# Patient Record
Sex: MALE | Race: WHITE | NOT HISPANIC OR LATINO | Employment: OTHER | URBAN - METROPOLITAN AREA
[De-identification: names, ages, dates, MRNs, and addresses within clinical notes are randomized per-mention and may not be internally consistent; named-entity substitution may affect disease eponyms.]

---

## 2018-07-02 ENCOUNTER — ANESTHESIA EVENT (OUTPATIENT)
Dept: PERIOP | Facility: HOSPITAL | Age: 68
End: 2018-07-02
Payer: MEDICARE

## 2018-07-10 ENCOUNTER — TRANSCRIBE ORDERS (OUTPATIENT)
Dept: ADMINISTRATIVE | Facility: HOSPITAL | Age: 68
End: 2018-07-10

## 2018-07-10 ENCOUNTER — APPOINTMENT (OUTPATIENT)
Dept: PREADMISSION TESTING | Facility: HOSPITAL | Age: 68
End: 2018-07-10
Payer: MEDICARE

## 2018-07-10 ENCOUNTER — HOSPITAL ENCOUNTER (OUTPATIENT)
Dept: NON INVASIVE DIAGNOSTICS | Facility: HOSPITAL | Age: 68
Discharge: HOME | End: 2018-07-10
Attending: ORTHOPAEDIC SURGERY
Payer: MEDICARE

## 2018-07-10 ENCOUNTER — APPOINTMENT (OUTPATIENT)
Dept: LAB | Facility: HOSPITAL | Age: 68
End: 2018-07-10
Attending: ORTHOPAEDIC SURGERY
Payer: MEDICARE

## 2018-07-10 ENCOUNTER — HOSPITAL ENCOUNTER (OUTPATIENT)
Dept: RADIOLOGY | Facility: HOSPITAL | Age: 68
Discharge: HOME | End: 2018-07-10
Attending: ORTHOPAEDIC SURGERY
Payer: MEDICARE

## 2018-07-10 DIAGNOSIS — Z01.818 PREOP EXAMINATION: ICD-10-CM

## 2018-07-10 DIAGNOSIS — M17.12 OSTEOARTHRITIS OF LEFT KNEE, UNSPECIFIED OSTEOARTHRITIS TYPE: ICD-10-CM

## 2018-07-10 DIAGNOSIS — Z01.818 PREOP EXAMINATION: Primary | ICD-10-CM

## 2018-07-10 LAB
ABO GROUP BLD: NORMAL
ALBUMIN SERPL BCP-MCNC: 3.5 G/DL (ref 3.5–5)
ALP SERPL-CCNC: 108 U/L (ref 46–116)
ALT SERPL W P-5'-P-CCNC: 43 U/L (ref 12–78)
ANION GAP SERPL CALCULATED.3IONS-SCNC: 9 MMOL/L (ref 4–13)
AST SERPL W P-5'-P-CCNC: 31 U/L (ref 5–45)
ATRIAL RATE: 56 BPM
BASOPHILS # BLD AUTO: 0.07 THOUSANDS/ΜL (ref 0–0.1)
BASOPHILS NFR BLD AUTO: 1 % (ref 0–1)
BILIRUB SERPL-MCNC: 0.67 MG/DL (ref 0.2–1)
BILIRUB UR QL STRIP: ABNORMAL
BLD GP AB SCN SERPL QL: NEGATIVE
BUN SERPL-MCNC: 12 MG/DL (ref 5–25)
CALCIUM SERPL-MCNC: 9.5 MG/DL (ref 8.3–10.1)
CHLORIDE SERPL-SCNC: 105 MMOL/L (ref 100–108)
CLARITY UR: CLEAR
CO2 SERPL-SCNC: 27 MMOL/L (ref 21–32)
COLOR UR: YELLOW
CREAT SERPL-MCNC: 0.86 MG/DL (ref 0.6–1.3)
EOSINOPHIL # BLD AUTO: 0.34 THOUSAND/ΜL (ref 0–0.61)
EOSINOPHIL NFR BLD AUTO: 6 % (ref 0–6)
ERYTHROCYTE [DISTWIDTH] IN BLOOD BY AUTOMATED COUNT: 13.4 % (ref 11.6–15.1)
GFR SERPL CREATININE-BSD FRML MDRD: 91 ML/MIN/1.73SQ M
GLUCOSE P FAST SERPL-MCNC: 92 MG/DL (ref 65–99)
GLUCOSE UR STRIP-MCNC: NEGATIVE MG/DL
HCT VFR BLD AUTO: 45.5 % (ref 36.5–49.3)
HGB BLD-MCNC: 15.3 G/DL (ref 12–17)
HGB UR QL STRIP.AUTO: NEGATIVE
KETONES UR STRIP-MCNC: ABNORMAL MG/DL
LEUKOCYTE ESTERASE UR QL STRIP: NEGATIVE
LYMPHOCYTES # BLD AUTO: 1.03 THOUSANDS/ΜL (ref 0.6–4.47)
LYMPHOCYTES NFR BLD AUTO: 19 % (ref 14–44)
MCH RBC QN AUTO: 32.2 PG (ref 26.8–34.3)
MCHC RBC AUTO-ENTMCNC: 33.6 G/DL (ref 31.4–37.4)
MCV RBC AUTO: 96 FL (ref 82–98)
MONOCYTES # BLD AUTO: 0.7 THOUSAND/ΜL (ref 0.17–1.22)
MONOCYTES NFR BLD AUTO: 13 % (ref 4–12)
NEUTROPHILS # BLD AUTO: 3.23 THOUSANDS/ΜL (ref 1.85–7.62)
NEUTS SEG NFR BLD AUTO: 60 % (ref 43–75)
NITRITE UR QL STRIP: NEGATIVE
NRBC BLD AUTO-RTO: 0 /100 WBCS
P AXIS: 43 DEGREES
PH UR STRIP.AUTO: 5 [PH] (ref 4.5–8)
PLATELET # BLD AUTO: 149 THOUSANDS/UL (ref 149–390)
PMV BLD AUTO: 10.8 FL (ref 8.9–12.7)
POTASSIUM SERPL-SCNC: 3.7 MMOL/L (ref 3.5–5.3)
PR INTERVAL: 162 MS
PROT SERPL-MCNC: 7.5 G/DL (ref 6.4–8.2)
PROT UR STRIP-MCNC: NEGATIVE MG/DL
QRS AXIS: 49 DEGREES
QRSD INTERVAL: 92 MS
QT INTERVAL: 424 MS
QTC INTERVAL: 409 MS
RBC # BLD AUTO: 4.75 MILLION/UL (ref 3.88–5.62)
RH BLD: POSITIVE
SODIUM SERPL-SCNC: 141 MMOL/L (ref 136–145)
SP GR UR STRIP.AUTO: >=1.03 (ref 1–1.03)
SPECIMEN EXPIRATION DATE: NORMAL
T WAVE AXIS: 19 DEGREES
UROBILINOGEN UR QL STRIP.AUTO: 0.2 E.U./DL
VENTRICULAR RATE: 56 BPM
WBC # BLD AUTO: 5.37 THOUSAND/UL (ref 4.31–10.16)

## 2018-07-10 PROCEDURE — 86900 BLOOD TYPING SEROLOGIC ABO: CPT

## 2018-07-10 PROCEDURE — 80053 COMPREHEN METABOLIC PANEL: CPT

## 2018-07-10 PROCEDURE — 93005 ELECTROCARDIOGRAM TRACING: CPT

## 2018-07-10 PROCEDURE — 36415 COLL VENOUS BLD VENIPUNCTURE: CPT

## 2018-07-10 PROCEDURE — 81003 URINALYSIS AUTO W/O SCOPE: CPT | Performed by: ORTHOPAEDIC SURGERY

## 2018-07-10 PROCEDURE — 86850 RBC ANTIBODY SCREEN: CPT

## 2018-07-10 PROCEDURE — 85025 COMPLETE CBC W/AUTO DIFF WBC: CPT

## 2018-07-10 PROCEDURE — 71046 X-RAY EXAM CHEST 2 VIEWS: CPT

## 2018-07-10 PROCEDURE — 86901 BLOOD TYPING SEROLOGIC RH(D): CPT

## 2018-07-10 PROCEDURE — 93010 ELECTROCARDIOGRAM REPORT: CPT | Performed by: INTERNAL MEDICINE

## 2018-07-10 RX ORDER — CHLORHEXIDINE GLUCONATE 4 G/100ML
SOLUTION TOPICAL
COMMUNITY
End: 2018-07-19 | Stop reason: HOSPADM

## 2018-07-10 RX ORDER — PANTOPRAZOLE SODIUM 40 MG/1
40 TABLET, DELAYED RELEASE ORAL DAILY
COMMUNITY
Start: 2018-05-02

## 2018-07-10 RX ORDER — LACTULOSE 20 G/30ML
SOLUTION ORAL DAILY PRN
COMMUNITY

## 2018-07-10 RX ORDER — LORATADINE 10 MG/1
10 TABLET ORAL DAILY PRN
COMMUNITY

## 2018-07-10 RX ORDER — FLUTICASONE PROPIONATE 50 MCG
2 SPRAY, SUSPENSION (ML) NASAL DAILY PRN
COMMUNITY

## 2018-07-10 NOTE — ANESTHESIA PREPROCEDURE EVALUATION
Review of Systems/Medical History  Patient summary reviewed  Chart reviewed  No history of anesthetic complications     Cardiovascular  Negative cardio ROS    Pulmonary  Negative pulmonary ROS        GI/Hepatic    GERD , Liver disease (FATTY LIVER) ,        Negative  ROS        Endo/Other  Negative endo/other ROS      GYN       Hematology  Negative hematology ROS      Musculoskeletal    Arthritis     Neurology  Negative neurology ROS      Psychology   Negative psychology ROS              Physical Exam    Airway    Mallampati score: II  TM Distance: >3 FB  Neck ROM: full     Dental   No notable dental hx     Cardiovascular  Comment: Negative ROS, Rhythm: regular, Rate: normal, Cardiovascular exam normal    Pulmonary  Pulmonary exam normal Breath sounds clear to auscultation,     Other Findings        Anesthesia Plan  ASA Score- 2     Anesthesia Type- regional and spinal with ASA Monitors  Additional Monitors:   Airway Plan:     Comment: Adductor canal block preop  Plan Factors-Patient not instructed to abstain from smoking on day of procedure  Patient did not smoke on day of surgery  Induction- intravenous  Postoperative Plan-     Informed Consent- Anesthetic plan and risks discussed with patient

## 2018-07-10 NOTE — PRE-PROCEDURE INSTRUCTIONS
Pre-Surgery Instructions:   Medication Instructions    chlorhexidine (HIBICLENS) 4 % external liquid Patient was instructed by Physician and understands   Ergocalciferol (VITAMIN D2 PO) Instructed patient per Anesthesia Guidelines   fluticasone (FLONASE) 50 mcg/act nasal spray Instructed patient per Anesthesia Guidelines   lactulose 20 g/30 mL Instructed patient per Anesthesia Guidelines   loratadine (CLARITIN) 10 mg tablet Instructed patient per Anesthesia Guidelines   pantoprazole (PROTONIX) 40 mg tablet Instructed patient per Anesthesia Guidelines  Instructed to take pantoprazole am ofs urgery with sip ofw ater per anesthesia DR Kierra Rayo

## 2018-07-17 RX ORDER — CEFAZOLIN SODIUM 1 G/3ML
2000 INJECTION, POWDER, FOR SOLUTION INTRAMUSCULAR; INTRAVENOUS ONCE
Status: CANCELLED | OUTPATIENT
Start: 2018-07-18

## 2018-07-18 ENCOUNTER — HOSPITAL ENCOUNTER (OUTPATIENT)
Facility: HOSPITAL | Age: 68
Setting detail: OBSERVATION
Discharge: HOME | End: 2018-07-19
Attending: ORTHOPAEDIC SURGERY | Admitting: ORTHOPAEDIC SURGERY
Payer: MEDICARE

## 2018-07-18 ENCOUNTER — APPOINTMENT (OUTPATIENT)
Dept: RADIOLOGY | Facility: HOSPITAL | Age: 68
End: 2018-07-18
Payer: MEDICARE

## 2018-07-18 ENCOUNTER — ANESTHESIA (OUTPATIENT)
Dept: PERIOP | Facility: HOSPITAL | Age: 68
End: 2018-07-18
Payer: MEDICARE

## 2018-07-18 DIAGNOSIS — M17.12 PRIMARY OSTEOARTHRITIS OF LEFT KNEE: Primary | ICD-10-CM

## 2018-07-18 PROCEDURE — C1776 JOINT DEVICE (IMPLANTABLE): HCPCS | Performed by: ORTHOPAEDIC SURGERY

## 2018-07-18 PROCEDURE — G8982 BODY POS GOAL STATUS: HCPCS

## 2018-07-18 PROCEDURE — 97166 OT EVAL MOD COMPLEX 45 MIN: CPT

## 2018-07-18 PROCEDURE — C1713 ANCHOR/SCREW BN/BN,TIS/BN: HCPCS | Performed by: ORTHOPAEDIC SURGERY

## 2018-07-18 PROCEDURE — G8981 BODY POS CURRENT STATUS: HCPCS

## 2018-07-18 PROCEDURE — G8987 SELF CARE CURRENT STATUS: HCPCS

## 2018-07-18 PROCEDURE — 97163 PT EVAL HIGH COMPLEX 45 MIN: CPT

## 2018-07-18 PROCEDURE — 97110 THERAPEUTIC EXERCISES: CPT

## 2018-07-18 PROCEDURE — 73560 X-RAY EXAM OF KNEE 1 OR 2: CPT

## 2018-07-18 PROCEDURE — G8988 SELF CARE GOAL STATUS: HCPCS

## 2018-07-18 DEVICE — POSTERIOR STABILIZED FEMORAL
Type: IMPLANTABLE DEVICE | Site: KNEE | Status: FUNCTIONAL
Brand: TRIATHLON

## 2018-07-18 DEVICE — SYMMETRIC PATELLA
Type: IMPLANTABLE DEVICE | Site: KNEE | Status: FUNCTIONAL
Brand: TRIATHLON

## 2018-07-18 DEVICE — UNIVERSAL TIBIAL BASEPLATE
Type: IMPLANTABLE DEVICE | Site: KNEE | Status: FUNCTIONAL
Brand: TRIATHLON

## 2018-07-18 DEVICE — TIBIAL BEARING INSERT - PS
Type: IMPLANTABLE DEVICE | Site: KNEE | Status: FUNCTIONAL
Brand: TRIATHLON

## 2018-07-18 DEVICE — IMPLANTABLE DEVICE: Type: IMPLANTABLE DEVICE | Site: KNEE | Status: FUNCTIONAL

## 2018-07-18 RX ORDER — TETRACAINE HCL 10 MG/ML
INJECTION SUBARACHNOID AS NEEDED
Status: DISCONTINUED | OUTPATIENT
Start: 2018-07-18 | End: 2018-07-18 | Stop reason: SURG

## 2018-07-18 RX ORDER — PROPOFOL 10 MG/ML
INJECTION, EMULSION INTRAVENOUS AS NEEDED
Status: DISCONTINUED | OUTPATIENT
Start: 2018-07-18 | End: 2018-07-18 | Stop reason: SURG

## 2018-07-18 RX ORDER — ONDANSETRON 2 MG/ML
4 INJECTION INTRAMUSCULAR; INTRAVENOUS ONCE
Status: DISCONTINUED | OUTPATIENT
Start: 2018-07-18 | End: 2018-07-18 | Stop reason: HOSPADM

## 2018-07-18 RX ORDER — SENNOSIDES 8.6 MG
1 TABLET ORAL DAILY
Status: DISCONTINUED | OUTPATIENT
Start: 2018-07-19 | End: 2018-07-19 | Stop reason: HOSPADM

## 2018-07-18 RX ORDER — CALCIUM CARBONATE 200(500)MG
1000 TABLET,CHEWABLE ORAL DAILY PRN
Status: DISCONTINUED | OUTPATIENT
Start: 2018-07-18 | End: 2018-07-19 | Stop reason: HOSPADM

## 2018-07-18 RX ORDER — TRAMADOL HYDROCHLORIDE 50 MG/1
50 TABLET ORAL EVERY 6 HOURS PRN
Status: DISCONTINUED | OUTPATIENT
Start: 2018-07-18 | End: 2018-07-18

## 2018-07-18 RX ORDER — ONDANSETRON 2 MG/ML
INJECTION INTRAMUSCULAR; INTRAVENOUS AS NEEDED
Status: DISCONTINUED | OUTPATIENT
Start: 2018-07-18 | End: 2018-07-18 | Stop reason: SURG

## 2018-07-18 RX ORDER — OXYCODONE HYDROCHLORIDE 10 MG/1
10 TABLET ORAL EVERY 4 HOURS PRN
Status: DISCONTINUED | OUTPATIENT
Start: 2018-07-18 | End: 2018-07-19 | Stop reason: HOSPADM

## 2018-07-18 RX ORDER — SODIUM CHLORIDE, SODIUM LACTATE, POTASSIUM CHLORIDE, CALCIUM CHLORIDE 600; 310; 30; 20 MG/100ML; MG/100ML; MG/100ML; MG/100ML
125 INJECTION, SOLUTION INTRAVENOUS CONTINUOUS
Status: DISCONTINUED | OUTPATIENT
Start: 2018-07-18 | End: 2018-07-19 | Stop reason: HOSPADM

## 2018-07-18 RX ORDER — PANTOPRAZOLE SODIUM 40 MG/1
40 TABLET, DELAYED RELEASE ORAL
Status: DISCONTINUED | OUTPATIENT
Start: 2018-07-19 | End: 2018-07-19 | Stop reason: HOSPADM

## 2018-07-18 RX ORDER — ACETAMINOPHEN 325 MG/1
650 TABLET ORAL EVERY 6 HOURS PRN
Status: DISCONTINUED | OUTPATIENT
Start: 2018-07-18 | End: 2018-07-18

## 2018-07-18 RX ORDER — VANCOMYCIN HYDROCHLORIDE 1 G/200ML
1000 INJECTION, SOLUTION INTRAVENOUS ONCE
Status: COMPLETED | OUTPATIENT
Start: 2018-07-18 | End: 2018-07-18

## 2018-07-18 RX ORDER — ROPIVACAINE HYDROCHLORIDE 5 MG/ML
INJECTION, SOLUTION EPIDURAL; INFILTRATION; PERINEURAL AS NEEDED
Status: DISCONTINUED | OUTPATIENT
Start: 2018-07-18 | End: 2018-07-18 | Stop reason: SURG

## 2018-07-18 RX ORDER — OXYCODONE HYDROCHLORIDE 5 MG/1
5 TABLET ORAL EVERY 4 HOURS PRN
Status: DISCONTINUED | OUTPATIENT
Start: 2018-07-18 | End: 2018-07-19 | Stop reason: HOSPADM

## 2018-07-18 RX ORDER — FENTANYL CITRATE 50 UG/ML
INJECTION, SOLUTION INTRAMUSCULAR; INTRAVENOUS AS NEEDED
Status: DISCONTINUED | OUTPATIENT
Start: 2018-07-18 | End: 2018-07-18 | Stop reason: SURG

## 2018-07-18 RX ORDER — PROPOFOL 10 MG/ML
INJECTION, EMULSION INTRAVENOUS CONTINUOUS PRN
Status: DISCONTINUED | OUTPATIENT
Start: 2018-07-18 | End: 2018-07-18 | Stop reason: SURG

## 2018-07-18 RX ORDER — PANTOPRAZOLE SODIUM 40 MG/1
40 TABLET, DELAYED RELEASE ORAL DAILY
Status: DISCONTINUED | OUTPATIENT
Start: 2018-07-19 | End: 2018-07-18 | Stop reason: SDUPTHER

## 2018-07-18 RX ORDER — KETOROLAC TROMETHAMINE 30 MG/ML
15 INJECTION, SOLUTION INTRAMUSCULAR; INTRAVENOUS EVERY 6 HOURS PRN
Status: DISCONTINUED | OUTPATIENT
Start: 2018-07-18 | End: 2018-07-19 | Stop reason: HOSPADM

## 2018-07-18 RX ORDER — TRANEXAMIC ACID 100 MG/ML
INJECTION, SOLUTION INTRAVENOUS AS NEEDED
Status: DISCONTINUED | OUTPATIENT
Start: 2018-07-18 | End: 2018-07-18 | Stop reason: HOSPADM

## 2018-07-18 RX ORDER — ASPIRIN 81 MG/1
81 TABLET ORAL 2 TIMES DAILY
Status: DISCONTINUED | OUTPATIENT
Start: 2018-07-18 | End: 2018-07-19 | Stop reason: HOSPADM

## 2018-07-18 RX ORDER — TRANEXAMIC ACID 100 MG/ML
INJECTION, SOLUTION INTRAVENOUS AS NEEDED
Status: DISCONTINUED | OUTPATIENT
Start: 2018-07-18 | End: 2018-07-18 | Stop reason: SURG

## 2018-07-18 RX ORDER — MAGNESIUM HYDROXIDE/ALUMINUM HYDROXICE/SIMETHICONE 120; 1200; 1200 MG/30ML; MG/30ML; MG/30ML
20 SUSPENSION ORAL EVERY 6 HOURS PRN
Status: DISCONTINUED | OUTPATIENT
Start: 2018-07-18 | End: 2018-07-19 | Stop reason: HOSPADM

## 2018-07-18 RX ORDER — LORATADINE 10 MG/1
10 TABLET ORAL DAILY PRN
Status: DISCONTINUED | OUTPATIENT
Start: 2018-07-18 | End: 2018-07-19 | Stop reason: HOSPADM

## 2018-07-18 RX ORDER — PANTOPRAZOLE SODIUM 40 MG/1
40 TABLET, DELAYED RELEASE ORAL DAILY
Status: DISCONTINUED | OUTPATIENT
Start: 2018-07-19 | End: 2018-07-18

## 2018-07-18 RX ORDER — MAGNESIUM HYDROXIDE 1200 MG/15ML
LIQUID ORAL AS NEEDED
Status: DISCONTINUED | OUTPATIENT
Start: 2018-07-18 | End: 2018-07-18 | Stop reason: HOSPADM

## 2018-07-18 RX ORDER — MIDAZOLAM HYDROCHLORIDE 1 MG/ML
INJECTION INTRAMUSCULAR; INTRAVENOUS AS NEEDED
Status: DISCONTINUED | OUTPATIENT
Start: 2018-07-18 | End: 2018-07-18 | Stop reason: SURG

## 2018-07-18 RX ORDER — METHOCARBAMOL 500 MG/1
500 TABLET, FILM COATED ORAL EVERY 6 HOURS PRN
Status: DISCONTINUED | OUTPATIENT
Start: 2018-07-18 | End: 2018-07-19 | Stop reason: HOSPADM

## 2018-07-18 RX ORDER — FLUTICASONE PROPIONATE 50 MCG
2 SPRAY, SUSPENSION (ML) NASAL DAILY PRN
Status: DISCONTINUED | OUTPATIENT
Start: 2018-07-18 | End: 2018-07-19 | Stop reason: HOSPADM

## 2018-07-18 RX ORDER — CELECOXIB 200 MG/1
200 CAPSULE ORAL DAILY
Status: DISCONTINUED | OUTPATIENT
Start: 2018-07-19 | End: 2018-07-19 | Stop reason: HOSPADM

## 2018-07-18 RX ORDER — SODIUM CHLORIDE 9 MG/ML
125 INJECTION, SOLUTION INTRAVENOUS CONTINUOUS
Status: DISCONTINUED | OUTPATIENT
Start: 2018-07-18 | End: 2018-07-19 | Stop reason: HOSPADM

## 2018-07-18 RX ORDER — FENTANYL CITRATE/PF 50 MCG/ML
25 SYRINGE (ML) INJECTION
Status: DISCONTINUED | OUTPATIENT
Start: 2018-07-18 | End: 2018-07-18 | Stop reason: HOSPADM

## 2018-07-18 RX ADMIN — SODIUM CHLORIDE: 0.9 INJECTION, SOLUTION INTRAVENOUS at 13:00

## 2018-07-18 RX ADMIN — TETRACAINE HCL 1.2 ML: 10 INJECTION SUBARACHNOID at 12:45

## 2018-07-18 RX ADMIN — METHOCARBAMOL 500 MG: 500 TABLET ORAL at 20:05

## 2018-07-18 RX ADMIN — SODIUM CHLORIDE 125 ML/HR: 0.9 INJECTION, SOLUTION INTRAVENOUS at 08:40

## 2018-07-18 RX ADMIN — TRANEXAMIC ACID 1000 MG: 100 INJECTION, SOLUTION INTRAVENOUS at 12:49

## 2018-07-18 RX ADMIN — FENTANYL CITRATE 100 MCG: 50 INJECTION, SOLUTION INTRAMUSCULAR; INTRAVENOUS at 12:40

## 2018-07-18 RX ADMIN — PROPOFOL 40 MG: 10 INJECTION, EMULSION INTRAVENOUS at 12:58

## 2018-07-18 RX ADMIN — SODIUM CHLORIDE 125 ML/HR: 0.9 INJECTION, SOLUTION INTRAVENOUS at 12:01

## 2018-07-18 RX ADMIN — SODIUM CHLORIDE: 0.9 INJECTION, SOLUTION INTRAVENOUS at 14:02

## 2018-07-18 RX ADMIN — DEXAMETHASONE SODIUM PHOSPHATE 4 MG: 10 INJECTION INTRAMUSCULAR; INTRAVENOUS at 12:56

## 2018-07-18 RX ADMIN — ROPIVACAINE HYDROCHLORIDE 30 ML: 5 INJECTION, SOLUTION EPIDURAL; INFILTRATION; PERINEURAL at 12:21

## 2018-07-18 RX ADMIN — ACETAMINOPHEN 650 MG: 325 TABLET, FILM COATED ORAL at 16:40

## 2018-07-18 RX ADMIN — MIDAZOLAM 4 MG: 1 INJECTION INTRAMUSCULAR; INTRAVENOUS at 12:16

## 2018-07-18 RX ADMIN — CEFAZOLIN SODIUM 2000 MG: 2 SOLUTION INTRAVENOUS at 12:35

## 2018-07-18 RX ADMIN — FENTANYL CITRATE 100 MCG: 50 INJECTION, SOLUTION INTRAMUSCULAR; INTRAVENOUS at 12:16

## 2018-07-18 RX ADMIN — CEFAZOLIN SODIUM 1000 MG: 1 SOLUTION INTRAVENOUS at 20:05

## 2018-07-18 RX ADMIN — OXYCODONE HYDROCHLORIDE 5 MG: 5 TABLET ORAL at 18:33

## 2018-07-18 RX ADMIN — PROPOFOL 75 MCG/KG/MIN: 10 INJECTION, EMULSION INTRAVENOUS at 13:00

## 2018-07-18 RX ADMIN — OXYCODONE HYDROCHLORIDE 10 MG: 10 TABLET ORAL at 22:51

## 2018-07-18 RX ADMIN — ONDANSETRON HYDROCHLORIDE 4 MG: 2 INJECTION, SOLUTION INTRAVENOUS at 14:05

## 2018-07-18 RX ADMIN — ASPIRIN 81 MG: 81 TABLET, COATED ORAL at 18:33

## 2018-07-18 RX ADMIN — MIDAZOLAM 2 MG: 1 INJECTION INTRAMUSCULAR; INTRAVENOUS at 12:35

## 2018-07-18 RX ADMIN — SODIUM CHLORIDE, SODIUM LACTATE, POTASSIUM CHLORIDE, AND CALCIUM CHLORIDE 125 ML/HR: .6; .31; .03; .02 INJECTION, SOLUTION INTRAVENOUS at 18:34

## 2018-07-18 RX ADMIN — VANCOMYCIN HYDROCHLORIDE 1000 MG: 1 INJECTION, SOLUTION INTRAVENOUS at 12:51

## 2018-07-18 NOTE — PROGRESS NOTES
Doing well  CDI  No dorsi/plantar bilaterally, c/w spinal effect  A/P: NV checks by nursing; PT; Med input; pain control

## 2018-07-18 NOTE — OCCUPATIONAL THERAPY NOTE
OccupationalTherapy Evaluation(time=1650-1720)     Patient Name: Pam LION Date: 7/18/2018  Problem List  There is no problem list on file for this patient  Past Medical History  Past Medical History:   Diagnosis Date    Fatty liver     Food allergy     strawberries- tickling in throat    GERD (gastroesophageal reflux disease)     OA (osteoarthritis)     left knee    Seasonal allergies     Wears glasses      Past Surgical History  Past Surgical History:   Procedure Laterality Date    CATARACT EXTRACTION Bilateral     ESOPHAGOGASTRODUODENOSCOPY      KNEE ARTHROSCOPY Left     LIVER BIOPSY           07/18/18 1721   Note Type   Note type Eval only   Restrictions/Precautions   Weight Bearing Precautions Per Order Yes   LLE Weight Bearing Per Order WBAT   Other Precautions Multiple lines; Fall Risk;Pain   Pain Assessment   Pain Assessment 0-10   Pain Score 2   Pain Type Surgical pain;Acute pain   Pain Location Ankle; Foot   Pain Orientation Left   Home Living   Type of [address] [place] [address] Two level;Bed/bath upstairs  (3 stevie, 13steps to 2nd)   Prior Function   Lives With Spouse   Lifestyle   Autonomy PTA pt states independence with all aspects of his ADLs, transfers, ambulation; neg falls, neg home alone   Reciprocal Relationships supportive TAMMY, wife   Service to Others retired   Intrinsic Gratification watching TV   Psychosocial   Psychosocial (WDL) WDL   Subjective   Subjective "I don't have too much feeling in my legs "   ADL   Where Assessed Edge of bed   Eating Assistance 6  Modified independent   Grooming Assistance 6  Modified Independent   UB Bathing Assistance 5  Supervision/Setup   LB Bathing Assistance 4  [address] 5  Supervision/Setup   LB Dressing Assistance 3  Moderate Assistance   Bed Mobility   Rolling R 4  Minimal assistance   Additional items Assist x 1; Increased time required;Verbal cues;LE management   Rolling L 4  Minimal assistance Additional items Assist x 1; Increased time required;LE management;Verbal cues   Supine to Sit 3  Moderate assistance   Additional items Assist x 1; Increased time required;Verbal cues;LE management   Sit to Supine 3  Moderate assistance   Additional items Assist x 1; Increased time required;Verbal cues   Transfers   Sit to Stand Unable to assess  (2* decreased motor movement b/l LE's)   Functional Mobility   Functional Mobility (TBA)   Balance   Static Sitting Poor +   Dynamic Sitting Poor   Activity Tolerance   Activity Tolerance Patient limited by fatigue;Patient limited by pain;Treatment limited secondary to medical complications (Comment)   Medical Staff Made Aware nsg, P T     RUE Assessment   RUE Assessment WFL   RUE Strength   RUE Overall Strength Within Functional Limits - strength 5/5   LUE Assessment   LUE Assessment WFL   LUE Strength   LUE Overall Strength Within Functional Limits - strength 5/5   Hand Function   Gross Motor Coordination Functional   Fine Motor Coordination Functional   Sensation   Light Touch No apparent deficits   Proprioception   Proprioception No apparent deficits   Vision-Basic Assessment   Current Vision Wears glasses all the time   Vision - Complex Assessment   Acuity Able to read clock/calendar on wall without difficulty   Perception   Inattention/Neglect Appears intact   Cognition   Overall Cognitive Status WFL   Arousal/Participation Alert   Attention Within functional limits   Orientation Level Oriented X4   Memory Within functional limits   Following Commands Follows all commands and directions without difficulty   Assessment   Limitation Decreased ADL status; Decreased Safe judgement during ADL;Decreased endurance   Prognosis Good   Assessment Pt is a 66y/o male admitted to the hospital for an elected L TKR(7/18) 2* hx OA and knee instability(i e  states hx dislocations/need for hinged brace)   PTA pt states independence with all aspects of his ADLs, transfers, ambulation; neg falls, neg home alone  During initial eval, pt demonstrated deficits with his functional balance, functional mobility, and ADL status  Functional mobility assessment limited 2* poor motor movement from his spinal  Pt would benefit from continued OT assessement and tx for the above deficits  1-4 OT tx sessions  Goals   Patient Goals "to go home "   STG Time Frame 3-5   Short Term Goal #1 Pt will tolerate continued functional mobility assessment and appropriate goals will be established  Short Term Goal #2 Pt will demonstrate mod I with their bed mobility to facilitate EOB ADLs  Short Term Goal  Pt will demonstrate mod I with their sit-stand transfers to assist with completion of their LE dressing  LTG Time Frame (5-7)   Long Term Goal #1 Pt will demonstrate g/g- balance with all functional activities  Long Term Goal #2 Pt will demonstrate mod I with their UE and LE bathing/dresssing  Long Term Goal Pt will demonstrate proper walker/transfer safety 100% of the time  Plan   Treatment Interventions ADL retraining;Functional transfer training; Endurance training;Patient/family training;Equipment evaluation/education; Compensatory technique education;Continued evaluation   Goal Expiration Date 07/25/18   Treatment Day 0   OT Frequency 3-5x/wk   Recommendation   OT Discharge Recommendation (continue P T  )   Equipment Recommended Bedside commode  (RW)   Barthel Index   Feeding 10   Bathing 0   Grooming Score 5   Dressing Score 5   Bladder Score 0   Bowels Score 10   Toilet Use Score 5   Transfers (Bed/Chair) Score 5   Mobility (Level Surface) Score 0   Stairs Score 0   Barthel Index Score 40   Demetra Rosa, OT

## 2018-07-18 NOTE — ANESTHESIA PROCEDURE NOTES
Spinal Block    Patient location during procedure: OR  Start time: 7/18/2018 12:45 PM  Staffing  Anesthesiologist: Alison Paz  Performed: anesthesiologist   Preanesthetic Checklist  Completed: patient identified, surgical consent, pre-op evaluation, timeout performed, IV checked, risks and benefits discussed and monitors and equipment checked  Spinal Block  Patient position: sitting  Prep: Betadine  Patient monitoring: continuous pulse ox and frequent blood pressure checks  Approach: midline  Location: L3-4  Injection technique: single-shot  Needle  Needle type: pencil-tip   Needle gauge: 24 G  Needle length: 5 cm  Assessment  Injection Assessment:  negative aspiration for heme, no paresthesia on injection and positive aspiration for clear CSF    Post-procedure:  site cleaned

## 2018-07-18 NOTE — PHYSICAL THERAPY NOTE
PT EVALUATION (16:48-17:10= 22mins)    Pt  Name: Rosette Hagen  Pt  Age: 74 y o  MRN: [id number]    There is no problem list on file for this patient  Admitting Diagnoses:   Primary osteoarthritis of left knee [M17 12]    Past Medical History:   Diagnosis Date    Fatty liver     Food allergy     strawberries- tickling in throat    GERD (gastroesophageal reflux disease)     OA (osteoarthritis)     left knee    Seasonal allergies     Wears glasses        Past Surgical History:   Procedure Laterality Date    CATARACT EXTRACTION Bilateral     ESOPHAGOGASTRODUODENOSCOPY      KNEE ARTHROSCOPY Left     LIVER BIOPSY        07/18/18 1720   Note Type   Note type Eval/Treat   Pain Assessment   Pain Assessment 0-10   Pain Score 2   Pain Location Ankle   Hospital Pain Intervention(s) Repositioned   Home Living   Type of [address] Two level;Stairs to enter with rails  (2+1STE; 13steps to 2nd level)   Home Equipment Other (Comment)  (no DME)   Prior Function   Level of [place] Independent with ADLs and functional mobility   Lives With Spouse   Receives Help From Family   ADL Assistance Independent   Falls in the last 6 months 0   Comments prior to surgery, pt was wearing L hinged-knee brace 2* to L knee instability   Restrictions/Precautions   Weight Bearing Precautions Per Order Yes   LLE Weight Bearing Per Order WBAT   Other Precautions Multiple lines; Fall Risk;Pain  (impaired BLE motor & sensation)   General   Family/Caregiver Present Yes  (wife)   Cognition   Overall Cognitive Status WFL   Arousal/Participation Alert   Orientation Level Oriented X4   Following Commands Follows all commands and directions without difficulty   RUE Assessment   RUE Assessment (refer to OT)   LUE Assessment   LUE Assessment (refer to OT)   RLE Assessment   RLE Assessment X   Strength RLE   RLE Overall Strength 2/5   Tone RLE   RLE Tone Hypotonic   LLE Assessment   LLE Assessment X   Strength LLE   LLE Overall Strength 1/5   Tone LLE   LLE Tone Hypotonic   Coordination   Sensation X  (BLE numbness, proximal>distal & LLE>RLE)   Bed Mobility   Supine to Sit 3  Moderate assistance   Additional items Assist x 1;HOB elevated; Bedrails; Increased time required;LE management;Verbal cues   Sit to Supine 3  Moderate assistance   Additional items Assist x 1;Bedrails; Increased time required;Verbal cues;LE management   Additional Comments cues for techniques   Transfers   Sit to Stand Unable to assess   Stand to Sit Unable to assess   Additional Comments not apppropriate at this time 2* to poor motor & sensation BLE   Ambulation/Elevation   Gait pattern Not appropriate   Balance   Static Sitting Poor +   Dynamic Sitting Poor   Static Standing Zero   Activity Tolerance   Activity Tolerance Patient tolerated treatment well   Nurse Made Aware Nicolasa   Assessment   Prognosis Good   Problem List Decreased strength;Decreased range of motion;Decreased endurance; Impaired balance;Decreased mobility; Impaired tone; Impaired sensation;Pain   Assessment Pt  74 y  o male admitted for elective L TKR 2* to severe DJD, POD#0  Pt referred to PT for mobility assessment & D/C planning  Activity order: activity as tolerated  PTA, pt reports being I w/o AD but wears L hinged-knee brace 2* to L knee instability  During visit, PT eval limited to bedlevel assessment only 2* to post-op poor BLE motor & sensation  (+) BLE numbness, proximal>distal & LLE>RLE  Pt require modAx1 for bed mobility + cues for techniques  (+) poor sitting balance  Transfers & amb not appropriate at this time 2* to poor BLE motor & sensation  No dizziness reported t/o session  Most recent vital signs as follows: /88 (BP Location: Right arm)   Pulse 82   Temp (!) 97 °F (36 1 °C) (Temporal)   Resp 20   Ht 5' 5" (1 651 m)   Wt 63 7 kg (140 lb 6 4 oz)   SpO2 97%   BMI 23 36 kg/m²   Pt performed BLE thera  ex while sitting at EOB, please see PT tx note below for details  At end of session, pt back in bed w/o issues, call bell & phone in reach  Will continue skilled PT to improve function & safety  PT to see next tx session to complete OOB mobility assessment & determine D/C rec  Of note, pt prefers to return home at D/C  CM to follow  Nsg staff to continue to mobilized pt as tolerated to prevent further decline in function  Nsg notified  Barriers to Discharge Inaccessible home environment   Goals   Patient Goals go home   STG Expiration Date 07/25/18   Short Term Goal #1 Goals to be met in 7 days; pt will be able to: 1) inc strength & balance by 1/2 grade to improve overall functional mobility & dec fall risk; 2) inc bed mobility to S for pt to be able to get in/OOB safely w/o % of the time & to dec caregiver assistance; 3) PT to complete OOB mobility assessment & set appropriate goals; 4) inc barthel score to 55 to decrease overall risk for falls; 5) pt/caregiver ed   Treatment Day 0   Plan   Treatment/Interventions LE strengthening/ROM; Therapeutic exercise; Endurance training;Patient/family training;Bed mobility;Continued evaluation;Spoke to nursing;OT;Family   PT Frequency Twice a day;7x/wk; Weekend   Recommendation   Recommendation Home PT;Outpatient PT; Home with family support  (depending on progress)   Equipment Recommended Walker; Other (Comment)  (RW & BSC)   PT - OK to Discharge No  (pt to achieve PT goals prior to D/C home)   Barthel Index   Feeding 10   Bathing 0   Grooming Score 5   Dressing Score 5   Bladder Score 0   Bowels Score 10   Toilet Use Score 5   Transfers (Bed/Chair) Score 0   Mobility (Level Surface) Score 0   Stairs Score 0   Barthel Index Score 35   Hx/personal factors: comorbidities, inaccessible home, pain, use of AD, fall risk  Examination: dec mobility, dec balance, dec endurance, dec amb, high fall risk, pain, impaired BLE motor & sensation  Clinical: unpredictable (ongoing medical status, high fall risk, POD #0 and pain mgt)  Complexity: high PT TREATMENT NOTE:    TIME IN: 16:10  TIME OUT: 16:20  TOTAL TIME: 10mins      S: Pt agreeable to thera ex   O: Pt performed seated TKR ex, 10reps, BLE PROM  A: Pt tolerated above mentioned thera  ex well  After exercises noted inc AROM to B/L ankle  Pt instructed on TKR protocol, PT POC & intervention as well as D/C rec w/ good understanding  Pt assisted back in bed at end of session  Call bell & phone in reach  Nsg notified  P: Continue PT per POC  PT to see next tx session         Errol Correia, PT

## 2018-07-18 NOTE — PLAN OF CARE
Problem: PHYSICAL THERAPY ADULT  Goal: Performs mobility at highest level of function for planned discharge setting  See evaluation for individualized goals  Treatment/Interventions: LE strengthening/ROM, Therapeutic exercise, Endurance training, Patient/family training, Bed mobility, Continued evaluation, Spoke to nursing, OT, Family  Equipment Recommended: Claudia Valdez, Other (Comment) ( & [facility])       See flowsheet documentation for full assessment, interventions and recommendations  Outcome: Progressing  Prognosis: Good  Problem List: Decreased strength, Decreased range of motion, Decreased endurance, Impaired balance, Decreased mobility, Impaired tone, Impaired sensation, Pain  Assessment: Pt  74 y  o male admitted for elective L TKR 2* to severe DJD, POD#0  Pt referred to PT for mobility assessment & D/C planning  Activity order: activity as tolerated  PTA, pt reports being I w/o AD but wears L hinged-knee brace 2* to L knee instability  During visit, PT eval limited to bedlevel assessment only 2* to post-op poor BLE motor & sensation  (+) BLE numbness, proximal>distal & LLE>RLE  Pt require modAx1 for bed mobility + cues for techniques  (+) poor sitting balance  Transfers & amb not appropriate at this time 2* to poor BLE motor & sensation  No dizziness reported t/o session  Most recent vital signs as follows: /88 (BP Location: Right arm)   Pulse 82   Temp (!) 97 °F (36 1 °C) (Temporal)   Resp 20   Ht 5' 5" (1 651 m)   Wt 63 7 kg (140 lb 6 4 oz)   SpO2 97%   BMI 23 36 kg/m²   Pt performed BLE thera  ex while sitting at EOB, please see PT tx note below for details  At end of session, pt back in bed w/o issues, call bell & phone in reach  Will continue skilled PT to improve function & safety  PT to see next tx session to complete OOB mobility assessment & determine D/C rec  Of note, pt prefers to return home at D/C  CM to follow   Nsg staff to continue to mobilized pt as tolerated to prevent further decline in function  Nsg notified  Barriers to Discharge: Inaccessible home environment     Recommendation: Home PT, Outpatient PT, Home with family support (depending on progress)     PT - OK to Discharge: No (pt to achieve PT goals prior to D/C home)    See flowsheet documentation for full assessment

## 2018-07-18 NOTE — OP NOTE
OPERATIVE REPORT  PATIENT NAME: Charmayne Neighbors    :  1950  MRN: [id number]  Pt Location: [facility] OR ROOM 01    Operative Note    Charmayne Neighbors  2018    Pre-op Diagnosis:   Left Knee Osteoarthritis    Post-op Diagnosis:   Left Knee Osteoarthritis    Procedure:  Left Knee Replacement    Surgeon:  Chata Anderson MD    Assistants:  MICHAEL Thornton CFA       Anesthesia:  Spinal with peripheral nerve block  Components:     Implant Name Type Inv  Item Serial No   Lot No  LRB No  Used   CEMENT BN 40 GM PALACOS HVG RADIOPAQUE - [id number]  CEMENT BN 40 GM PALACOS HVG RADIOPAQUE  [employer] [id number] Left 2   COMPONENT FEM CMNT SZ 4 LT PS TRIATHLON - [id number]  COMPONENT FEM CMNT SZ 4 LT PS TRIATHLON  [employer] [id number] Left 1   BASEPLATE TIBIAL CMNT SZ 3 TS UNV TRIATHLON - [id number]  BASEPLATE TIBIAL CMNT SZ 3 TS UNV TRIATHLON  [employer] [id number] Left 1   COMPONENT PATELLAR SMTR 29 X 8MM  TRIATHLON X3 - [id number]  COMPONENT PATELLAR SMTR 29 X 8MM  TRIATHLON X3  [employer] [id number] Left 1   INSERT TIBIAL 11MM SZ 3 PS TRIATHLON X3 - [id number]   INSERT TIBIAL 11MM SZ 3 PS TRIATHLON X3   [employer] [id number] Left 1          INDICATION: Charmayne Neighbors is a 74 y o  male with advanced osteoarthritis of the Left knee, which was refractory to nonoperative therapy  He understood the risks and benefits of knee replacement and wished to proceed  DESCRIPTION OF PROCEDURE: On 2018 the patient was brought to the holding area  The identity and surgical site were confirmed by him and the surgeon  Perioperative intravenous antibiotics were given  Anesthesia was administered by the anesthesia team  His Left lower extremity was prepped and draped in the usual sterile manner  An anterior midline incision incorporating the prior anterior distal ACL incision was followed by a medial parapatellar arthrotomy  Osteophytes were removed  The ACL and PCL were both resected  The extramedullary guide was used to make the proximal tibial cut  We checked the alignment and found it to be satisfactory  Then the intramedullary guide was placed in the femoral canal allowing us to make the distal femoral cut in 5 degrees of valgus  With the knee in extension, we were able to place an extension block with full extension and good soft tissue tension and no further releases were needed  Then the femur was appropriately sized based on the femoral dimensions the flexion/extension gap matching  The rotation was cross-referenced against the transepicondylar axis and found to be satisfactory  With the cutting block in place, we were able to place a similarly sized flexion block with good soft tissue tension  Therefore, femoral cuts were made  Posterior osteophytes were removed  The medial and lateral menisci were removed  A posterior capsular release was made  The patella was resurfaced and the size was chosen based on optimal coverage of the cut surface, and a caliper was used before and after the cut to ensure optimal thickness  At this point, the implants were trialed and the knee was able to come to full extension with good flexion and appropriate soft tissue tension throughout the range of motion and good patellar stability  This would be rechecked after the components are cemented  This was checked with the tourniquet down and all bleeders were carefully coagulated  The tibia was prepared in a proper amount of external rotation and the tibial size was chosen to achieve optimal bony coverage without overhang  The tourniquet was inflated after coagulating all bleeders and the knee was irrigated copiously and the bony surfaces were dried thoroughly  Cement was mixed and applied to the backside of the components  The tibia was cemented first, followed by the femur  Excess cement was removed  Then a trial spacer was placed  The knee was reduced and pressurized in extension   The patella was then cemented  We brought the knee back into flexion to make sure there was no excess cement and we trialed the spacer options again  The chosen spacer thickness allowed the knee to have full extension, good flexion and good soft tissue tension throughout the range of motion with good patellar tracking  Therefore, this was chosen and snapped into place  The knee was irrigated copiously with pulse lavage  Bleeders were again carefully coagulated  A periarticular injection was injected for postoperative analgesia  The arthrotomy was closed with #1 Vicryl suture  The skin was closed using Vicryl sutures, staples and a sterile dressing  The patient was then awakened and taken to the recovery room  The assistance of ARAMIS Covington was essential as no qualified resident assistance was available  XRAY REPORT: Postoperative x-rays including an AP and lateral of the Left knee from 7/18/2018 were checked and revealed a Left knee replacement in good alignment with no fracture or dislocation              Jose Alfredo Palma MD     Date: 7/18/2018  Time: 3:13 PM

## 2018-07-18 NOTE — PROGRESS NOTES
Operative Note    Madyson Bella  7/18/2018    Pre-op Diagnosis:   Left Knee Osteoarthritis    Post-op Diagnosis:   Left Knee Osteoarthritis    Procedure:  Left Knee Replacement    Surgeon:  Gina Gann MD    Assistants:  Neila Dakins Taber, PA-C Nicole Pinto, CFA       Anesthesia:  Spinal with peripheral nerve block  Components:     Implant Name Type Inv  Item Serial No   Lot No  LRB No  Used   CEMENT BN 40 GM PALACOS HVG RADIOPAQUE - [id number]  CEMENT BN 40 GM PALACOS HVG RADIOPAQUE  [employer] [id number] Left 2   COMPONENT FEM CMNT SZ 4 LT PS TRIATHLON - [id number]  COMPONENT FEM CMNT SZ 4 LT PS TRIATHLON  [employer] [id number] Left 1   BASEPLATE TIBIAL CMNT SZ 3 TS UNV TRIATHLON - [id number]  BASEPLATE TIBIAL CMNT SZ 3 TS UNV TRIATHLON  [employer] [id number] Left 1   COMPONENT PATELLAR SMTR 29 X 8MM  TRIATHLON X3 - [id number]  COMPONENT PATELLAR SMTR 29 X 8MM  TRIATHLON X3  [id number] 1   INSERT TIBIAL 11MM SZ 3 PS TRIATHLON X3 - [id number]   INSERT TIBIAL 11MM SZ 3 PS TRIATHLON X3   [employer] [id number] Left 1          INDICATION: Madyson Bella is a 74 y o  male with advanced osteoarthritis of the Left knee, which was refractory to nonoperative therapy  He understood the risks and benefits of knee replacement and wished to proceed  DESCRIPTION OF PROCEDURE: On 7/18/2018 the patient was brought to the holding area  The identity and surgical site were confirmed by him and the surgeon  Perioperative intravenous antibiotics were given  Anesthesia was administered by the anesthesia team  His Left lower extremity was prepped and draped in the usual sterile manner  An anterior midline incision incorporating his prior anterior distal ACL incision was followed by a medial parapatellar arthrotomy  Osteophytes were removed  The ACL and PCL were both resected  The extramedullary guide was used to make the proximal tibial cut   We checked the alignment and found it to be satisfactory  Then the intramedullary guide was placed in the femoral canal allowing us to make the distal femoral cut in 5 degrees of valgus    With the knee in extension, we were able to place an extension block with full extension and good soft tissue tension and no further releases were needed  Then the femur was appropriately sized based on the femoral dimensions the flexion/extension gap matching  The rotation was cross-referenced against the transepicondylar axis and found to be satisfactory  With the cutting block in place, we were able to place a similarly sized flexion block with good soft tissue tension  Therefore, femoral cuts were made  Posterior osteophytes were removed  The medial and lateral menisci were removed  A posterior capsular release was made  The patella was resurfaced and the size was chosen based on optimal coverage of the cut surface, and a caliper was used before and after the cut to ensure optimal thickness  At this point, the implants were trialed and the knee was able to come to full extension with good flexion and appropriate soft tissue tension throughout the range of motion and good patellar stability  This would be rechecked after the components are cemented  This was checked with the tourniquet down and all bleeders were carefully coagulated  The tibia was prepared in a proper amount of external rotation and the tibial size was chosen to achieve optimal bony coverage without overhang  The tourniquet was inflated after coagulating all bleeders and the knee was irrigated copiously and the bony surfaces were dried thoroughly  Cement was mixed and applied to the backside of the components  The tibia was cemented first, followed by the femur  Excess cement was removed  Then a trial spacer was placed  The knee was reduced and pressurized in extension  The patella was then cemented   We brought the knee back into flexion to make sure there was no excess cement and we trialed the spacer options again  The chosen spacer thickness allowed the knee to have full extension, good flexion and good soft tissue tension throughout the range of motion with good patellar tracking  Therefore, this was chosen and snapped into place  The knee was irrigated copiously with pulse lavage  Bleeders were again carefully coagulated  A periarticular injection was injected for postoperative analgesia  The arthrotomy was closed with #1 Vicryl suture  The skin was closed using Vicryl sutures, staples and a sterile dressing  The patient was then awakened and taken to the recovery room  The assistance of ARAMIS Kay was essential as no qualified resident assistance was available  XRAY REPORT: Postoperative x-rays including an AP and lateral of the Left knee from 7/18/2018 were checked and revealed a Left knee replacement in good alignment with no fracture or dislocation              Ankur Smith MD     Date: 7/18/2018  Time: 3:11 PM

## 2018-07-18 NOTE — ANESTHESIA PROCEDURE NOTES
Peripheral Block    Patient location during procedure: holding area  Start time: 7/18/2018 12:16 PM  Removal time: 7/18/2018 12:21 PM  Reason for block: at surgeon's request and post-op pain management  Staffing  Anesthesiologist: Antonio Crabtree  Performed: anesthesiologist   Preanesthetic Checklist  Completed: patient identified, site marked, surgical consent, pre-op evaluation, timeout performed, IV checked, risks and benefits discussed and monitors and equipment checked  Peripheral Block  Patient position: supine  Prep: ChloraPrep  Patient monitoring: continuous pulse ox and frequent blood pressure checks  Block type: adductor canal block  Laterality: left  Injection technique: single-shot  Procedures: ultrasound guided  Ultrasound permanent image saved  Local infiltration: ropivacaine  Infiltration strength: 0 5 %  Dose: 30 mL  Needle  Needle type: Stimuplex   Needle gauge: 22 G  Needle length: 10 cm  Needle localization: anatomical landmarks and ultrasound guidance  Test dose: negative  Assessment  Injection assessment: incremental injection, local visualized surrounding nerve on ultrasound and negative aspiration for heme  Paresthesia pain: none  Heart rate change: no  Slow fractionated injection: yes  Post-procedure:  site cleaned  patient tolerated the procedure well with no immediate complications

## 2018-07-19 VITALS
RESPIRATION RATE: 18 BRPM | BODY MASS INDEX: 23.39 KG/M2 | HEIGHT: 65 IN | OXYGEN SATURATION: 100 % | WEIGHT: 140.4 LBS | SYSTOLIC BLOOD PRESSURE: 126 MMHG | TEMPERATURE: 97.6 F | DIASTOLIC BLOOD PRESSURE: 79 MMHG | HEART RATE: 76 BPM

## 2018-07-19 PROBLEM — M17.12 PRIMARY OSTEOARTHRITIS OF LEFT KNEE: Status: RESOLVED | Noted: 2018-07-19 | Resolved: 2018-07-19

## 2018-07-19 PROBLEM — M17.12 PRIMARY OSTEOARTHRITIS OF LEFT KNEE: Status: ACTIVE | Noted: 2018-07-19

## 2018-07-19 LAB
ALBUMIN SERPL BCP-MCNC: 2.4 G/DL (ref 3.5–5)
ALP SERPL-CCNC: 98 U/L (ref 46–116)
ALT SERPL W P-5'-P-CCNC: 29 U/L (ref 12–78)
AMMONIA PLAS-SCNC: 14 UMOL/L (ref 11–35)
ANION GAP SERPL CALCULATED.3IONS-SCNC: 8 MMOL/L (ref 4–13)
AST SERPL W P-5'-P-CCNC: 34 U/L (ref 5–45)
BILIRUB SERPL-MCNC: 0.32 MG/DL (ref 0.2–1)
BUN SERPL-MCNC: 9 MG/DL (ref 5–25)
CALCIUM SERPL-MCNC: 8.3 MG/DL (ref 8.3–10.1)
CHLORIDE SERPL-SCNC: 109 MMOL/L (ref 100–108)
CO2 SERPL-SCNC: 20 MMOL/L (ref 21–32)
CREAT SERPL-MCNC: 0.74 MG/DL (ref 0.6–1.3)
GFR SERPL CREATININE-BSD FRML MDRD: 97 ML/MIN/1.73SQ M
GLUCOSE SERPL-MCNC: 177 MG/DL (ref 65–140)
HCT VFR BLD AUTO: 36.9 % (ref 36.5–49.3)
HGB BLD-MCNC: 12.7 G/DL (ref 12–17)
POTASSIUM SERPL-SCNC: 4.2 MMOL/L (ref 3.5–5.3)
PROT SERPL-MCNC: 5.5 G/DL (ref 6.4–8.2)
SODIUM SERPL-SCNC: 137 MMOL/L (ref 136–145)

## 2018-07-19 PROCEDURE — 97110 THERAPEUTIC EXERCISES: CPT

## 2018-07-19 PROCEDURE — 80053 COMPREHEN METABOLIC PANEL: CPT | Performed by: INTERNAL MEDICINE

## 2018-07-19 PROCEDURE — 82140 ASSAY OF AMMONIA: CPT | Performed by: INTERNAL MEDICINE

## 2018-07-19 PROCEDURE — 85014 HEMATOCRIT: CPT | Performed by: PHYSICIAN ASSISTANT

## 2018-07-19 PROCEDURE — 97535 SELF CARE MNGMENT TRAINING: CPT

## 2018-07-19 PROCEDURE — 85018 HEMOGLOBIN: CPT | Performed by: PHYSICIAN ASSISTANT

## 2018-07-19 PROCEDURE — 97530 THERAPEUTIC ACTIVITIES: CPT

## 2018-07-19 PROCEDURE — 97116 GAIT TRAINING THERAPY: CPT

## 2018-07-19 RX ORDER — METHOCARBAMOL 500 MG/1
500 TABLET, FILM COATED ORAL EVERY 6 HOURS PRN
Qty: 40 TABLET | Refills: 0 | Status: SHIPPED | OUTPATIENT
Start: 2018-07-19

## 2018-07-19 RX ORDER — CELECOXIB 200 MG/1
200 CAPSULE ORAL DAILY
Qty: 30 CAPSULE | Refills: 1 | Status: SHIPPED | OUTPATIENT
Start: 2018-07-19

## 2018-07-19 RX ORDER — OXYCODONE HYDROCHLORIDE 5 MG/1
5-10 TABLET ORAL EVERY 4 HOURS PRN
Qty: 60 TABLET | Refills: 0 | Status: SHIPPED | OUTPATIENT
Start: 2018-07-19 | End: 2018-07-29

## 2018-07-19 RX ORDER — ASPIRIN 81 MG/1
81 TABLET ORAL 2 TIMES DAILY
Qty: 90 TABLET | Refills: 0 | Status: SHIPPED | OUTPATIENT
Start: 2018-07-19

## 2018-07-19 RX ADMIN — OXYCODONE HYDROCHLORIDE 10 MG: 10 TABLET ORAL at 10:33

## 2018-07-19 RX ADMIN — OXYCODONE HYDROCHLORIDE 10 MG: 10 TABLET ORAL at 06:39

## 2018-07-19 RX ADMIN — SODIUM CHLORIDE, SODIUM LACTATE, POTASSIUM CHLORIDE, AND CALCIUM CHLORIDE 125 ML/HR: .6; .31; .03; .02 INJECTION, SOLUTION INTRAVENOUS at 03:00

## 2018-07-19 RX ADMIN — KETOROLAC TROMETHAMINE 15 MG: 30 INJECTION, SOLUTION INTRAMUSCULAR at 10:34

## 2018-07-19 RX ADMIN — ASPIRIN 81 MG: 81 TABLET, COATED ORAL at 08:09

## 2018-07-19 RX ADMIN — OXYCODONE HYDROCHLORIDE 10 MG: 10 TABLET ORAL at 16:58

## 2018-07-19 RX ADMIN — PANTOPRAZOLE SODIUM 40 MG: 40 TABLET, DELAYED RELEASE ORAL at 06:39

## 2018-07-19 RX ADMIN — Medication 1 TABLET: at 08:09

## 2018-07-19 RX ADMIN — KETOROLAC TROMETHAMINE 15 MG: 30 INJECTION, SOLUTION INTRAMUSCULAR at 03:03

## 2018-07-19 RX ADMIN — SENNOSIDES 8.6 MG: 8.6 TABLET, FILM COATED ORAL at 08:09

## 2018-07-19 RX ADMIN — CELECOXIB 200 MG: 200 CAPSULE ORAL at 08:09

## 2018-07-19 RX ADMIN — CEFAZOLIN SODIUM 1000 MG: 1 SOLUTION INTRAVENOUS at 04:46

## 2018-07-19 NOTE — PHYSICAL THERAPY NOTE
PT PROGRESS NOTE    Name: Mini Sotomayor  AGE: 74 y o  MRN: [id number]             07/19/18 1425   Pain Assessment   Pain Assessment 0-10   Pain Score 2   Pain Type Acute pain;Surgical pain   Pain Location Knee   Pain Orientation Left   Hospital Pain Intervention(s) Medication (See MAR); Cold applied;Repositioned; Emotional support; Ambulation/increased activity   Restrictions/Precautions   LLE Weight Bearing Per Order WBAT   Other Precautions Fall Risk;Pain   General   Chart Reviewed Yes   Response to Previous Treatment Patient with no complaints from previous session  Family/Caregiver Present No   Cognition   Overall Cognitive Status WFL   Arousal/Participation Alert; Cooperative   Attention Within functional limits   Orientation Level Oriented X4   Following Commands Follows all commands and directions without difficulty   Subjective   Subjective Pt agreeable to therapy  Bed Mobility   Sit to Supine 7  Independent   Transfers   Sit to Stand 6  Modified independent   Stand to Sit 6  Modified independent   Additional Comments pt demonstrate proper techniques w/o cues   Ambulation/Elevation   Gait pattern Antalgic  (reciprocal)   Gait Assistance 6  Modified independent   Assistive Device Rolling walker   Distance 500'x1   Stair Management Assistance 6  Modified independent   Stair Management Technique One rail R;Step to pattern; Foreward;Nonreciprocal   Number of Stairs 10   Balance   Static Sitting Normal   Static Standing Normal   Ambulatory Good   Activity Tolerance   Activity Tolerance Patient tolerated treatment well   Nurse Made Aware ken   Exercises   TKR Supine;Sitting;10 reps;AROM; Left   Assessment   Prognosis Excellent   Problem List Decreased range of motion;Decreased strength;Pain; Impaired balance   Assessment Pt seen for PT per POC  Improved mobility & activity tolerance noted this tx session  Pt have met set goals in this setting   See above levels of assistance required for all functional tasks  Gait antalgic but reciprocal & steady  Pt able to negotiate 10stesps w/ modified I, nonreciprocal w/ R railing  Pt tolerated above mentioned thera  ex well, AROM  No dizziness reported t/o session  Most recent vital signs as follows: /77 (BP Location: Right arm)   Pulse 76   Temp 97 9 °F (36 6 °C) (Temporal)   Resp 18   Ht 5' 5" (1 651 m)   Wt 63 7 kg (140 lb 6 4 oz)   SpO2 97%   BMI 23 36 kg/m²   From PT standpoint, pt ready to D/C home when medically cleared  Pt educated on HEP, car transfers, home safety & mgt w/ good understanding  At end of session, pt back in bed w/o issues, call bell & phone in reach  Will continue to recommend OPPT and family support at D/C  RW & BSC delivered  Pt may ambulate in room/unit as tolerated to prevent decline in function  Nsg notified  Barriers to Discharge None   Goals   Patient Goals go home today   STG Expiration Date 07/25/18   Treatment Day 2   Plan   Treatment/Interventions Functional transfer training;LE strengthening/ROM; Elevations; Therapeutic exercise; Endurance training;Patient/family training;Bed mobility;Gait training;Spoke to nursing;OT;Spoke to case management   Progress Progressing toward goals   PT Frequency Twice a day;7x/wk; Weekend   Recommendation   Recommendation Outpatient PT; Home with family support   Equipment Recommended Deepika Dueñas; Other (Comment)  (RW & BSC delivered)   PT - OK to Discharge Yes  (when medically cleared)   Helio Arevalo, PT

## 2018-07-19 NOTE — CONSULTS
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 during variceal surveillance by Dr Jeffries - GI  Fatty liver disease  Elevated alkaline phosphatase  Indwelling Bustos catheter     Recommendation: -    Patient seems to be recuperating well following his surgery, continue postoperative care as recommended by orthopedic service  Avoid all hepatotoxic drugs as indicated due to his underlying liver disease  Check ammonia level with a m  labs and liver function tests and platelet count  Avoid acetaminophen and all hepatotoxic drugs  I have reviewed patients medications as initiated on post operative orders by the primary team  Recommend  monitoring closely for any hypoxemia / respiratory insufficieny related to narcotics and to reduce doses and frequency of narcotics if necessary  Resume patients home medications and I have reviewed them  I would increase the dose of Protonix to 40 mg b i d  If patient would have any evidence of tremors delirium or psychosis would consider withdrawal precautions  Maintain Intravenous Fluids for next 24 -48 hours, till patient able to reliably keep meals and meds in  Suggest  Zofran ODT 4 mg sublingually PRN for control of post operative nausea  Patient encouraged to  use Incentive spirometry q 15 minutes while awake to minimize risk of postoperative atelectasis and pneumonia  Patient verbalized to understand and fully comprehend      Recommend DVT prophylaxis with use of Venodyne compression boots  If any factor X A inhibitor,  low molecule weight heparin or Coumadin is planned by the primary team, we will be happy to dose that  drug based on patient's hemostasis  Maintain ASA as antiplatelet drug per Ortho  Team  Use Proton Pump inhibitor to minimize risk of gastritis  Monitor for any tinnitus as an early sign of salicylism and check salicylate levels in that situation  Discontinue patient's Bustos catheter within next 24-48 hours in order  to minimize risk of catheter associated UTI  Please check patient's hemoglobin and hematocrit within next 24 hours to ensure that there is no acute blood loss anemia which would need any blood transfusion  We will follow this pleasant patient with your service closely and recommend necessary changes based on  further hospital course and diagnostics  Thank you, Dr Zakia Fagan , for your kind consultation  HISTORY OF PRESENT ILLNESS:    Reason for Consult:  Post operative management following elective replacement of left knee    HPI: Ada Dakin is a 74 y o  male who has suffered chronic pain of his left for long period of time  He underwent various conservative treatments but continued to have deterioration of his activities of daily living  He was seen by orthopedic service and was found to have both clinical and radiographic evidence of advanced osteoarthritis and was given option either to continue conservative treatments or to undergo surgery  After being cleared by primary care physician he underwent his surgery earlier today  He is not complaining of any issues at present  Patient was unable to tell me much details particularly pertaining to his recent hospitalization but upon further review of his record he was noted that he was recently hospitalized for possibility of alcoholic liver disease      During patient's review of records was noted that he was recently hospitalized to [facility] with history of alcohol abuse with nausea vomiting and possibility of syncope and seizure  He was admitted and noted to have elevated ammonia, no antiepileptic therapy was recommended and he had GI workup done  He was found to have fatty liver disease related to his alcohol  He was also found to have Saez's esophagitis and biopsies were pending  He was advised to continue PPI and stain from alcohol that time  Review of Systems   Constitutional:  he denies chills, diaphoresis, fatigue or fever  HEENT:  Negative for congestion, sore throat and tinnitus  No visual complaints  Respiratory:  Negative cough, chest tightness, shortness of breath and wheezing  Cardiovascular:  Negative for chest pain and palpitations  Gastrointestinal: Negative for abdominal distention or pain, constipation, diarrhea and nausea  Upon review of his records he has been followed by GI team and was recently admitted for hepatic encephalopathy when his ammonia level was noted to be more than 300  He has underlying history of Saez's esophagitis/alcohol use  Endocrine: Negative for cold intolerance, heat intolerance, polydipsia and polyuria  Genitourinary:                Negative for  dysuria, flank pain  Tolerating Bustos without difficulty  Skin:   Negative for color change, pallor and rash  Neurological:   Negative for dizziness, tremors, seizures, syncope, facial asymmetry, speech difficulty, weakness, light-headedness, numbness and headaches  Hematological:  Musculoskeletal:  Psychiatric:  No h/o spontaneous bruising/bleeding  Joint pains as above  Negative for agitation, behavioral problems, confusion, decreased concentration, dysphoric mood and sleep disturbance  A complete system-based review of systems as discussed with patient is otherwise negative      PAST MEDICAL HISTORY:  Past Medical History:   Diagnosis Date    Fatty liver     Food allergy     strawberries- tickling in throat    GERD (gastroesophageal reflux disease)     OA (osteoarthritis)     left knee    Seasonal allergies     Wears glasses      Past Surgical History:   Procedure Laterality Date    CATARACT EXTRACTION Bilateral     ESOPHAGOGASTRODUODENOSCOPY      KNEE ARTHROSCOPY Left     LIVER BIOPSY         FAMILY HISTORY:  Non-contributory    SOCIAL HISTORY:  History   Alcohol Use    1 8 oz/week    3 Cans of beer per week     Comment: 2-3 drinks day beer     History   Drug Use No     History   Smoking Status    Former Smoker    Quit date: 7/10/1976   Smokeless Tobacco    Never Used       ALLERGIES:  No Known Allergies    MEDICATIONS:  All current active medications have been reviewed    Current Facility-Administered Medications   Medication Dose Route Frequency Provider Last Rate Last Dose    acetaminophen (TYLENOL) tablet 650 mg  650 mg Oral Q6H PRN ARAMIS Brady-C   650 mg at 07/18/18 1640    aluminum-magnesium hydroxide-simethicone (MYLANTA) 200-200-20 mg/5 mL oral suspension 20 mL  20 mL Oral Q6H PRN Tarik Cardona PA-C        aspirin (ECOTRIN LOW STRENGTH) EC tablet 81 mg  81 mg Oral BID Tarik Cardona PA-C   81 mg at 07/18/18 1833    calcium carbonate (TUMS) chewable tablet 1,000 mg  1,000 mg Oral Daily PRN Tarik Cardona PA-C        ceFAZolin (ANCEF) IVPB (premix) 1,000 mg  1,000 mg Intravenous Q8H ARAMIS Brady-C 100 mL/hr at 07/18/18 2005 1,000 mg at 07/18/18 2005    [START ON 7/19/2018] celecoxib (CeleBREX) capsule 200 mg  200 mg Oral Daily Aubrey Villanueva PA-C        fluticasone [facility]) 50 mcg/act nasal spray 2 spray  2 spray Each Nare Daily PRN TerARAMIS Escobar-C        HYDROmorphone (DILAUDID) injection 0 2 mg  0 2 mg Intravenous Q3H PRN Teryl Brendan PA-QUYNH        ketorolac (TORADOL) injection 15 mg  15 mg Intravenous Q6H PRN Tarik Cardona PA-C        lactated ringers infusion  125 mL/hr Intravenous Continuous Tarik Cardona PA-C 125 mL/hr at 07/18/18 1834 125 mL/hr at 07/18/18 1834    loratadine (CLARITIN) tablet 10 mg  10 mg Oral Daily PRN Nithya Fagan MICHAEL Villanueva        methocarbamol (ROBAXIN) tablet 500 mg  500 mg Oral Q6H PRN Terry Orozco PA-C   500 mg at 18    [START ON 2018] multivitamin-minerals (CENTRUM) tablet 1 tablet  1 tablet Oral Daily Terry Orozco PA-C        oxyCODONE (ROXICODONE) immediate release tablet 10 mg  10 mg Oral Q4H PRN Terry Orozco PA-C        oxyCODONE (ROXICODONE) IR tablet 5 mg  5 mg Oral Q4H PRN Terry Orozco PA-C   5 mg at 183    [START ON 2018] pantoprazole (PROTONIX) EC tablet 40 mg  40 mg Oral Daily Aubrey Villanueva PA-C        [START ON 2018] senna (SENOKOT) tablet 8 6 mg  1 tablet Oral Daily Aubrey Villanueva PA-C        sodium chloride 0 9 % infusion  125 mL/hr Intravenous Continuous Rodney Paredes DO   Stopped at 18    traMADol (ULTRAM) tablet 50 mg  50 mg Oral Q6H PRN Terry Orozco PA-C           Prescriptions Prior to Admission   Medication    chlorhexidine (HIBICLENS) 4 % external liquid    Ergocalciferol (VITAMIN D2 PO)    lactulose 20 g/30 mL    pantoprazole (PROTONIX) 40 mg tablet    fluticasone (FLONASE) 50 mcg/act nasal spray    loratadine (CLARITIN) 10 mg tablet           PHYSICAL EXAM:    Vitals:  HR:  [57-82] 78  Resp:  [12-21] 18  BP: (117-144)/(60-88) 127/60  SpO2:  [95 %-100 %] 97 %  Temp (24hrs), Av 4 °F (36 3 °C), Min:97 °F (36 1 °C), Max:98 2 °F (36 8 °C)  Current: Temperature: (!) 97 1 °F (36 2 °C)  Body mass index is 23 36 kg/m²  General Appearance:    Awake, alert, cooperative, no distress, appears of stated age  Pallor JVP cyanosis icterus negative   Head:    Normocephalic, without obvious abnormality, atraumatic   Eyes:    Pupils equal in size,conjunctiva/corneas clear, EOM's intact  Ears:    External ear canals, both ears no drainage or redness  Nose:   No evidence of epistaxis/ discharge from nares  Throat:   Lips, mucosa, and tongue normal    Neck:   Supple, symmetrical, trachea midline     Lungs:     Bilateral air entry is broncho-alveolar and equal        No crepitation or rales  No pleural rub  Heart:    Regular rate and rhythm, S1S2 normal, no murmur, rub  Abdomen:     Soft, non-tender, no masses, no hepatosplenomegaly  Genitalia:   Indwelling Bustos catheter  Clear urine +, No hematuria  Musculoskeletal:   Extremities appear normal, atraumatic, no cyanosis or edema  Surgical site on the operated left knee has clear dressing / no bleeding or hemorrhage apparent  Vascular:   2+ in Posterior tibialis / dorsalis pedis  Skin:   Skin color, texture, turgor normal, no rashes or lesions   Neurologic:   Oriented/ Awake/ facial symmetry maintained  Speech is intact  Muscle bulk and strength is equivocal in B/L Upper and lower extremities except on operated left lower limb  Light Touch sensation is intact B/l LE  B/L [place] flexion is WNL  LABS, IMAGING, & OTHER STUDIES:  Lab Results:  I have personally reviewed pertinent labs  Lab Results   Component Value Date     07/10/2018     07/10/2018    CO2 27 07/10/2018    ANIONGAP 9 07/10/2018    BUN 12 07/10/2018    CREATININE 0 86 07/10/2018    EGFR 91 07/10/2018    CALCIUM 9 5 07/10/2018    AST 31 07/10/2018    ALT 43 07/10/2018    ALKPHOS 108 07/10/2018    PROT 7 5 07/10/2018    BILITOT 0 67 07/10/2018     Lab Results   Component Value Date    WBC 5 37 07/10/2018    HGB 15 3 07/10/2018     07/10/2018           Imaging Studies:   I have personally reviewed pertinent imaging study reports  Imaging:     Xr Chest Pa & Lateral    Result Date: 7/12/2018  Narrative:   CHEST INDICATION: Pre-op knee surgery   COMPARISON:  None   EXAM PERFORMED/VIEWS:  XR CHEST PA & LATERAL  dual-energy   FINDINGS: Cardiomediastinal silhouette appears unremarkable  The lungs are clear  No pneumothorax or pleural effusion  Osseous structures appear within normal limits for patient age  Impression: No acute cardiopulmonary disease        EKG, Pathology, and Other Studies:   I have personally reviewed pertinent reports  Ref Range & Units 7/10/18 1317   Ventricular Rate BPM 56    Atrial Rate BPM 56    NJ Interval ms 162    QRSD Interval ms 92    QT Interval ms 424    QTC Interval ms 409    P [place] degrees 43    QRS Axis degrees 49    T Wave Axis degrees 19    Narrative     Sinus bradycardia  Otherwise normal ECG  No previous ECGs available             Portions of the record may have been created with voice recognition software  Occasional wrong word or "sound a like" substitutions may have occurred due to the inherent limitations of voice recognition software  Read the chart carefully and recognize, using context, where substitutions have occurred

## 2018-07-19 NOTE — PROGRESS NOTES
Orthopedic Total Knee Progress Note    SUBJECTIVE    Status post Left total knee arthroplasty  Systemic or Specific Complaints: No Complaints    OBJECTIVE    Vital signs in last 24 hours:  Temp:  [96 4 °F (35 8 °C)-98 2 °F (36 8 °C)] 97 2 °F (36 2 °C)  HR:  [57-82] 65  Resp:  [12-21] 20  BP: (110-144)/(57-88) 110/57    Neurovascular: Capillary refill: Normal  Dorsiflexion: 5/5  Plantarflexion:  5/5   Wound: Dressing:  clean/dry/intact   DVT Exam: No evidence of DVT seen on physical exam      Data Review:  CBC:   Lab Results   Component Value Date    WBC 5 37 07/10/2018    RBC 4 75 07/10/2018    HGB 12 7 07/19/2018    HCT 36 9 07/19/2018     07/10/2018       ASSESSMENT/PLAN    Status post- Left total knee arthroplasty: Doing well postoperatively  Discharge today, Return to Clinic: as scheduled if cleared by therapy and Medicine      Wilbur Marquez PA-C    Date: 7/19/2018  Time: 6:36 AM

## 2018-07-19 NOTE — PLAN OF CARE
Problem: PHYSICAL THERAPY ADULT  Goal: Performs mobility at highest level of function for planned discharge setting  See evaluation for individualized goals  Treatment/Interventions: LE strengthening/ROM, Therapeutic exercise, Endurance training, gait training, elevations, Patient/family training, Bed mobility, Spoke to nursing, OT, Family  Equipment Recommended: [employer], Other (Comment) ( & [facility])       See flowsheet documentation for full assessment, interventions and recommendations  Outcome: Adequate for Discharge  Prognosis: Excellent  Problem List: Decreased strength, Impaired balance, Decreased range of motion, Pain  Assessment: Pt seen for PT per POC  BLE sensation intact  OOB mobility assessment complete  PT goals updated, please see below for details  Improved mobility & activity tolerance noted this tx session  See above levels of assistance required for all functional tasks  Gait deviations as above, antalgic w/ dec L knee flexion during swing but no gross LOB noted  Pt able to negotiate training stairs w/ S, nonreciprocal w/ B/L railing  Pt tolerated above mentioned thera  ex well, AROM  No dizziness reported t/o session  Most recent vital signs as follows: /77 (BP Location: Right arm)   Pulse 76   Temp 97 9 °F (36 6 °C) (Temporal)   Resp 18   Ht 5' 5" (1 651 m)   Wt 63 7 kg (140 lb 6 4 oz)   SpO2 97%   BMI 23 36 kg/m²   Will continue PT per POC  Attempt full flight of stairs next tx session  At end of session, pt tolerated  OOB in chair w/o issues, call bell & phone in reach  From PT standpoint, pt ready to D/C home today after PT pm session  Will recommend OPPT, RW, BSC and family support at D/C  CM to follow  Nsg staff to continue to mobilized pt (OOB in chair for all meals & ambulate in room/unit) as tolerated to prevent decline in function  Nsg notified     Barriers to Discharge: None     Recommendation: Outpatient PT, Home with family support     PT - OK to Discharge: Yes (after PT pm session)    See flowsheet documentation for full assessment

## 2018-07-19 NOTE — DISCHARGE INSTRUCTIONS
[facility]   Post Operative Joint Replacement Instructions   Dr Sruthi Gill Office [address] [phone]     Remove dressing post op day 7  Home Health care will remove the staples/sutures post op day 14  MEDICATIONS      *  Enteric Coated Aspirin 81 mg:  Start taking this twice daily on discharge and continue for 6 weeks  Continue as you were in the hospital/rehab facility     * Iron: Continue the iron supplement twice daily  If you experience nausea or constipation with the medicine discontinue its use  Contact us if the nausea persists  * Pain Medications: Your prescriptions may run out before you return for your next visit  Please give us two regular office day's notice before you run out, to prevent any problems of not having pain medicine  Be advised that prescriptions for Percocet and OxyContin cannot be phoned to your pharmacy  They will need to be picked up at our office  Please refrain from using alcohol with your pain medicines  Percocet contains 325 mg of Tylenol  You should not exceed 3000 mg of Tylenol in a day  Please be careful to not overdose the Tylenol  * Herbal Medicines: Please hold all these preparations until after your first post-operative visit  Unless specifically directed otherwise  ACTIVITY   * Your weight bearing status is: as tolerated     * If you have been furnished with an assistive device  Please feel free to try to wean from using it under the supervision of your therapist      * You may participate in activity as tolerated  It is likely that you will tire more easily for a time after surgery  Please rest when you need it to help your healing process  * Stairs are not restricted for you  Please climb stairs as instructed by your physical therapist      * For hip and knee replacement patients please elevate your leg or legs while resting  This is important to prevent lower leg swelling       * When you are back at home you will likely have physical therapy three times a week  On the days you do not have formal therapy please perform the home exercises given to you  * If you had a hip replacement, please follow the safety precautions given to you by the nurse or therapist taking care of you  * Immediately following the surgery you are not permitted to drive until cleared by your surgeon  This typically does not happen until your first visit after surgery  * Knee replacement patients may be passengers in a vehicle following their discharge from the hospital      * Hip replacement patients are not allowed in a vehicle, except for your trip home and your first follow up visit  Please follow these guidelines unless specifically instructed to start outpatient therapy at an earlier time  * Please do not perform lifting tasks or strenuous domestic activities  * If you currently are employed, you are off work until cleared by your surgeon  Everyone's ability to return to work is determined by his or her abilities  Your return to work may take a few weeks to a few months  * Sexual activities are permitted as tolerated  NORMAL FINDINGS   * Numbness along the incision     * Mechanical click of a knee replacement  * Your incision area will feel warm  WOUND CARE   * It is OK to shower once there is no spotting or drainage for a full 24 hours  Please do not submerge the incision into a pool, bath or hot tub until after your 1st post-operative visit  * Please do not disturb your staples, sutures, or the scabs  It promotes better healing and smaller scars  * Cover the incision with gauze dressing until there is no further drainage  * You may leave the incision open to the air when there is no discharge left on the gauze at changing time  Once this occurs you may shower  * Please be generous with the use of ice  It is a very good remedy  Apply ice for only twenty minutes at a time   You may use it every hour  It is recommended to ice before and after anticipated activities, which includes exercise and physical therapy  * Wear your knee-high pressure stockings every day  They may be removed for sleep at night  Continue to wear them until you are seen for your first follow up  * For knee and hip replacements; your staples will be removed about 14 days after your surgery date  Home nurses and physical therapists typically remove them  If they are unable to, please call our office to have us do it for you  FOLLOW UP   * You should have a scheduled visit with your surgeon scheduled for three to four weeks after surgery  If you do not remember your appointment date and time, or if you are sure you do not have one, please call to set one up  * Please inform the office if you experience one of the following:    - Fever that is not responding to Tylenol and is above 101 degrees    - Redness of the skin that is increasing in area    - Your incision is soaking the dressings with drainage or blood    - You're unable to bear weight on your operative leg or legs

## 2018-07-19 NOTE — PHYSICAL THERAPY NOTE
PT PROGRESS NOTE    Name: Pam Marx  AGE: 74 y o  MRN: [id number]             07/19/18 0858   Pain Assessment   Pain Assessment 0-10   Pain Score 2   Pain Type Acute pain;Surgical pain   Pain Location Knee   Pain Orientation Left   Hospital Pain Intervention(s) Medication (See MAR); Cold applied;Repositioned; Ambulation/increased activity   Restrictions/Precautions   LLE Weight Bearing Per Order WBAT   Other Precautions Fall Risk;Pain   General   Chart Reviewed Yes   Response to Previous Treatment Patient with no complaints from previous session  Family/Caregiver Present No   Cognition   Overall Cognitive Status WFL   Arousal/Participation Alert; Cooperative   Attention Within functional limits   Orientation Level Oriented X4   Following Commands Follows all commands and directions without difficulty   Subjective   Subjective Pt agreeable to therapy  Bed Mobility   Supine to Sit 7  Independent   Additional items Increased time required   Transfers   Sit to Stand 5  Supervision   Additional items Verbal cues   Stand to Sit 5  Supervision   Additional items Verbal cues   Additional Comments cues for hand placement   Ambulation/Elevation   Gait pattern Decreased L stance; Excessively slow;Decreased foot clearance  (reciprocal; dec L knee flexion during swing)   Gait Assistance 5  Supervision   Additional items Verbal cues   Assistive Device Rolling walker   Distance 250'x1   Stair Management Assistance 5  Supervision   Additional items Verbal cues   Stair Management Technique Two rails; Step to pattern;Nonreciprocal;Foreward   Number of Stairs 5  (x2)   Balance   Static Sitting Normal   Static Standing Good   Ambulatory Fair +   Activity Tolerance   Activity Tolerance Patient tolerated treatment well   Nurse Made Aware ken   Exercises   TKR Supine;Sitting;10 reps;AROM; Left   Assessment   Prognosis Excellent   Problem List Decreased strength; Impaired balance;Decreased range of motion;Pain   Assessment Pt seen for PT per POC  BLE sensation intact  OOB mobility assessment complete  PT goals updated, please see below for details  Improved mobility & activity tolerance noted this tx session  See above levels of assistance required for all functional tasks  Gait deviations as above, antalgic w/ dec L knee flexion during swing but no gross LOB noted  Pt able to negotiate training stairs w/ S, nonreciprocal w/ B/L railing  Pt tolerated above mentioned thera  ex well, AROM  No dizziness reported t/o session  Most recent vital signs as follows: /77 (BP Location: Right arm)   Pulse 76   Temp 97 9 °F (36 6 °C) (Temporal)   Resp 18   Ht 5' 5" (1 651 m)   Wt 63 7 kg (140 lb 6 4 oz)   SpO2 97%   BMI 23 36 kg/m²   Will continue PT per POC  Attempt full flight of stairs next tx session  At end of session, pt tolerated  OOB in chair w/o issues, call bell & phone in reach  From PT standpoint, pt ready to D/C home today after PT pm session  Will recommend OPPT, RW, BSC and family support at D/C  CM to follow  Nsg staff to continue to mobilized pt (OOB in chair for all meals & ambulate in room/unit) as tolerated to prevent decline in function  Nsg notified  Barriers to Discharge None   Goals   Patient Goals go home today   STG Expiration Date 07/25/18   Short Term Goal #1 Updated/Additional goals: Goals to be met in 6 days; pt will be able to: 1) inc strength & balance by 1/2 grade to improve overall functional mobility & dec fall risk; 2) inc bed mobility to I for pt to be able to get in/OOB safely w/o % of the time & to dec caregiver assistance; 3) inc transfers to modified I for pt to transition safely from one surface to another w/o % of the time & dec caregiver assistance; 4) inc amb w/ RW approx   >500' w/ modified I for pt to ambulate community distances w/o any % of the time & dec caregiver assistance; 4) negotiate stairs w/ modified I for inc safety during stair mgt inside/outside of home & dec caregiver assistance; 5) pt/caregiver ed   Treatment Day 1   Plan   Treatment/Interventions Functional transfer training;LE strengthening/ROM; Elevations; Therapeutic exercise; Endurance training;Patient/family training;Bed mobility;Gait training;Spoke to nursing   Progress Progressing toward goals   PT Frequency Twice a day;7x/wk; Weekend   Recommendation   Recommendation Outpatient PT; Home with family support   Equipment Recommended [place]; Other (Comment)  (RW & BSC)   PT - OK to Discharge Yes  (after PT pm session)       Darren Casper, PT

## 2018-07-19 NOTE — SOCIAL WORK
CM met with patient at bedside to address discharge needs  Patient reports living in a two story house with spouse; bedroom/bathroom is located on second floor  Patient is independent with ADLs and functional mobility  Patient needs a RW and BSC for discharge; referral submitted to [address] ; DME delivered by liaison  Patient is recommended for Outpt PT; patient reports he has his initial appointment arranged for Monday 7/23/18 at 0830  Patient's spouse is available at discharge to transport patient home  PCP identified as Dr Karla Philip  No POA identified; patient declined information at this time  Patient uses [facility] for Rx needs; patient made aware of [address] to use at discharge if needed  Help/support reported  Patient made aware of CM's name, number and role  No other needs at present  Cm to follow as needed

## 2018-07-19 NOTE — PROGRESS NOTES
Patient resting in bed at this time  Has full sensation in left leg  Positive pedal pulses  Ace wrap intact with electricool device  States he does not have pain but "muscle twinges"  Robaxin given at this time  Bustos patent and draining clear yellow urine  Will continue to monitor  Call bell in reach

## 2018-07-19 NOTE — PLAN OF CARE
Problem: PHYSICAL THERAPY ADULT  Goal: Performs mobility at highest level of function for planned discharge setting  See evaluation for individualized goals  Treatment/Interventions: LE strengthening/ROM, Therapeutic exercise, Endurance training, gait training, elevations, Patient/family training, Bed mobility, Spoke to nursing, OT, Family  Equipment Recommended: [place], Other (Comment) ( & [facility])       See flowsheet documentation for full assessment, interventions and recommendations  Outcome: Completed Date Met: 07/19/18  Prognosis: Excellent  Problem List: Decreased range of motion, Decreased strength, Pain, Impaired balance  Assessment: Pt seen for PT per POC  Improved mobility & activity tolerance noted this tx session  Pt have met set goals in this setting  See above levels of assistance required for all functional tasks  Gait antalgic but reciprocal & steady  Pt able to negotiate 10stesps w/ modified I, nonreciprocal w/ R railing  Pt tolerated above mentioned thera  ex well, AROM  No dizziness reported t/o session  Most recent vital signs as follows: /77 (BP Location: Right arm)   Pulse 76   Temp 97 9 °F (36 6 °C) (Temporal)   Resp 18   Ht 5' 5" (1 651 m)   Wt 63 7 kg (140 lb 6 4 oz)   SpO2 97%   BMI 23 36 kg/m²   From PT standpoint, pt ready to D/C home when medically cleared  Pt educated on HEP, car transfers, home safety & mgt w/ good understanding  At end of session, pt back in bed w/o issues, call bell & phone in reach  Will continue to recommend OPPT and family support at D/C  RW & [facility] delivered  Pt may ambulate in room/unit as tolerated to prevent decline in function  Nsg notified  Barriers to Discharge: None     Recommendation: Outpatient PT, Home with family support     PT - OK to Discharge: Yes (when medically cleared)    See flowsheet documentation for full assessment

## 2018-07-19 NOTE — OCCUPATIONAL THERAPY NOTE
OccupationalTherapy Progress Note     Patient Name: Alejandro Levy  DBHKZ'X Date: 7/19/2018  Problem List  There is no problem list on file for this patient         07/19/18 1351   Restrictions/Precautions   Weight Bearing Precautions Per Order Yes   LLE Weight Bearing Per Order WBAT   Other Precautions Fall Risk   Lifestyle   Autonomy PTA pt states independence with all aspects of his ADLs, transfers, ambulation; neg falls, neg home alone   Reciprocal Relationships supportive TAMMY, wife   Service to Others retired   Intrinsic Gratification watching TV   Pain Assessment   Pain Assessment No/denies pain   Pain Score No Pain   ADL   Grooming Assistance 5  Supervision/Setup   Grooming Deficit Setup;Supervision/safety; Increased time to complete   Grooming Comments Wash/dry hands at sink    [facility] Comments Don/doff T-Shirt   LB Dressing Assistance 5  Supervision/Setup   LB Dressing Deficit Setup;Supervision/safety; Increased time to complete;Verbal cueing   LB Dressing Comments LB dressing completed with Supervision  Pt able to don/doff B/L socks at a Mod I level, demonstrating increased flexibility in B/L knees to bring knees towards chest for task  Supervision required to don/doff underwear 2* cues required for technique- don operative LE first to increase independence with task  Toileting Assistance  5  Supervision/Setup   Toileting Deficit Supervison/safety; Increased time to complete;Grab bar use   Toileting Comments Toileting tasks completed at a Supervision level with use of grab bar for UE support during clothing management up/down  Functional Standing Tolerance   Time 8 mins   Activity Functional mobility, self care tasks   Comments No LOB noted   Bed Mobility   Supine to Sit 7  Independent   Sit to Supine 7  Independent   Additional Comments Bed mobility (supine<>sit) completed to assess pt's ability to get in/out of bed for return home   Pt able to complete all aspects of bed mobility at an Independent level without use of hospital bed features  Pt seated OOB in chair at end of session with call bell and phone within reach  All needs met and pt reports no further questions for OT at this time  Transfers   Sit to Stand 6  Modified independent   Additional items Armrests; Increased time required   Stand to Sit 6  Modified independent   Additional items Armrests; Increased time required   Toilet transfer 6  Modified independent   Additional items Increased time required;Standard toilet  (grab bar)   Additional Comments Transfers (sit<>stand, RW<>standard toilet) completed at a Mod I level level 2* increased time to complete and use of arm rests only  Functional Mobility   Functional Mobility 5  Supervision   Additional Comments Assist x1   Additional items Rolling walker   Toilet Transfers   Toilet Transfer From Rolling walker   Toilet Transfer Type To and from   Toilet Transfer to Standard toilet   Toilet Transfer Technique Stand pivot; Ambulating   Toilet Transfers Modified independence   Toilet Transfers Comments use of grab bar   Cognition   Overall Cognitive Status WFL   Arousal/Participation Alert; Cooperative   Attention Within functional limits   Orientation Level Oriented X4   Memory Within functional limits   Following Commands Follows all commands and directions without difficulty   Activity Tolerance   Activity Tolerance Patient tolerated treatment well   Medical Staff Made Aware Pt appropriate to be seen per nursing   Assessment   Assessment Pt seen for OT treatment session this PM focusing on functional activity tolerance, bed mobility, ADLs, and functional mobility/transfers  Pt alert and cooperative throughout session  Pt seated OOB in chair at start of session  Transfers (sit<>stand, RW<>standard toilet) completed at a Mod I level level 2* increased time to complete and use of arm rests only   Functional mobility completed with Supervision only for balance w/ Fair+ static standing balance demonstrated and Fair dynamic standing balance  Toileting tasks completed at a Supervision level with use of grab bar for UE support during clothing management up/down  Bed mobility (supine<>sit) completed to assess pt's ability to get in/out of bed for return home  Pt able to complete all aspects of bed mobility at an Independent level without use of hospital bed features  Pt then returned to bedside chair  Dressing tasks completed while seated OOB in chair  UB dressing completed at an independent level to don/doff T-Shirt  LB dressing completed with Supervision  Pt able to don/doff B/L socks at a Mod I level, demonstrating increased flexibility in B/L knees to bring knees towards chest for task  Supervision required to don/doff underwear 2* cues required for technique- don operative LE first to increase independence with task  Pt seated OOB in chair at end of session with call bell and phone within reach  All needs met and pt reports no further questions for OT at this time  Recommend home with family support and continued PT at D/C  OT to follow pt on caseload  Plan   Treatment Interventions ADL retraining;Functional transfer training; Endurance training;Patient/family training;Equipment evaluation/education; Compensatory technique education; Energy conservation; Activityengagement   Goal Expiration Date 07/25/18   Treatment Day 1   OT Frequency 3-5x/wk   Recommendation   OT Discharge Recommendation Home with family support  (Continued PT)   Equipment Recommended Bedside commode; Other (comment)  (RW)   OT - OK to Discharge Yes  (when medically cleared)   Barthel Index   Feeding 10   Bathing 5   Grooming Score 5   Dressing Score 5   Bladder Score 10   Bowels Score 10   Toilet Use Score 10   Transfers (Bed/Chair) Score 15   Mobility (Level Surface) Score 10   Stairs Score 5   Barthel Index Score 85   Modified Aniya Scale   Modified Aniya Scale 2         Estephania November, OTR/L

## 2018-07-19 NOTE — DISCHARGE SUMMARY
DISCHARGE SUMMARY      Patient Name: Val Kussmaul  Patient MRN: [id number]  Admitting Provider: Compa Pollock MD  Discharging Provider: Compa Pollock MD  Primary Care Physician at Discharge: Mala Flores [phone]  Admission Date: 7/18/2018       Discharge Date: 7/19/18    Admission Diagnosis  Primary osteoarthritis of left knee [M17 12]    Discharge Diagnoses  [facility] Course  Val Kussmaul was admitted to [facility] on 7/18/2018, with diagnosis of osteoarthritis in his Left knee  On the day of admission, he was brought to surgery, successfully underwent a Left knee replacement  He tolerated the procedure well  Following surgery, he was taken to the recovery room, at which time, there was a consult placed for Dr Mary Bush for the patient's medical management  After a short stay in the recovery room, he was transferred to the orthopedic floor at which time, he was resumed on his routine home medications per the hospitalist group  On postop day #1, he was able to start some physical therapy and was able to tolerate it well  At this time, he was in stable condition, showing no sign of deep vein thrombosis or pulmonary embolism  Incision was healing well at the time and showed no signs of infection  DISCHARGE DIAGNOSIS: Primary osteoarthritis of left knee [M17 12]  He is now status post Left total knee replacement, which he underwent during the hospital stay  Medications  See after visit summary for reconciled discharge medications provided to patient and family  Allergies  No Known Allergies    Outpatient Follow-Up  No future appointments      Discharge Disposition  home    Operative Procedures Performed  Procedure(s):  ARTHROPLASTY KNEE TOTAL        Tarik Cardona PA-C     DISCHARGE SUMMARY

## 2018-07-19 NOTE — PLAN OF CARE
Problem: OCCUPATIONAL THERAPY ADULT  Goal: Performs self-care activities at highest level of function for planned discharge setting  See evaluation for individualized goals  Treatment Interventions: ADL retraining, Functional transfer training, Endurance training, Patient/family training, Equipment evaluation/education, Compensatory technique education, Continued evaluation  Equipment Recommended: Bedside commode (RW)       See flowsheet documentation for full assessment, interventions and recommendations  Outcome: Progressing  Limitation: Decreased ADL status, Decreased Safe judgement during ADL, Decreased endurance  Prognosis: Good  Assessment: Pt seen for OT treatment session this PM focusing on functional activity tolerance, bed mobility, ADLs, and functional mobility/transfers  Pt alert and cooperative throughout session  Pt seated OOB in chair at start of session  Transfers (sit<>stand, RW<>standard toilet) completed at a Mod I level level 2* increased time to complete and use of arm rests only  Functional mobility completed with Supervision only for balance w/ Fair+ static standing balance demonstrated and Fair dynamic standing balance  Toileting tasks completed at a Supervision level with use of grab bar for UE support during clothing management up/down  Bed mobility (supine<>sit) completed to assess pt's ability to get in/out of bed for return home  Pt able to complete all aspects of bed mobility at an Independent level without use of hospital bed features  Pt then returned to bedside chair  Dressing tasks completed while seated OOB in chair  UB dressing completed at an independent level to don/doff T-Shirt  LB dressing completed with Supervision  Pt able to don/doff B/L socks at a Mod I level, demonstrating increased flexibility in B/L knees to bring knees towards chest for task   Supervision required to don/doff underwear 2* cues required for technique- don operative LE first to increase independence with task  Pt seated OOB in chair at end of session with call bell and phone within reach  All needs met and pt reports no further questions for OT at this time  Recommend home with family support and continued PT at D/C  OT to follow pt on caseload        OT Discharge Recommendation: Home with family support (Continued PT)  OT - OK to Discharge: Yes (when medically cleared)

## 2018-07-19 NOTE — CASE MANAGEMENT
Initial Clinical Review    Age/Sex: 74 y o  male    Surgery Date:    7/18/2018    Procedure: Pre-op Diagnosis:   Left Knee Osteoarthritis     Post-op Diagnosis:   Left Knee Osteoarthritis     Procedure:  Left Knee Replacement    Anesthesia:   Spinal with peripheral nerve block    Admission Orders: Date/Time/Statement:    OP NC  Bed    7/18    OBS  ORDER  ENTERED  7/19  @     1520    Orders Placed This Encounter   Procedures    Place in Observation     Standing Status:   Standing     Number of Occurrences:   1     Order Specific Question:   Admitting Physician     Answer:   Mateus Kraft [[id number]]     Order Specific Question:   Level of Care     Answer:   Med Surg [16]       Vital Signs: /79 (BP Location: Right arm)   Pulse 76   Temp 97 6 °F (36 4 °C) (Tympanic)   Resp 18   Ht 5' 5" (1 651 m)   Wt 63 7 kg (140 lb 6 4 oz)   SpO2 100%   BMI 23 36 kg/m²     Diet:        Diet Orders            Start     Ordered    07/18/18 1621  Diet Regular; Regular House  Diet effective now     Question Answer Comment   Diet Type Regular    Regular Regular House    RD to adjust diet per protocol?  Yes        07/18/18 1620          Mobility:   As  lupe    DVT Prophylaxis:   SCD'S    Pain Control:   Pain Medications             aspirin (ECOTRIN LOW STRENGTH) 81 mg EC tablet Take 1 tablet (81 mg total) by mouth 2 (two) times a day    celecoxib (CeleBREX) 200 mg capsule Take 1 capsule (200 mg total) by mouth daily    methocarbamol (ROBAXIN) 500 mg tablet Take 1 tablet (500 mg total) by mouth every 6 (six) hours as needed for muscle spasms    oxyCODONE (ROXICODONE) 5 mg immediate release tablet Take 1-2 tablets (5-10 mg total) by mouth every 4 (four) hours as needed for moderate pain for up to 10 days Max Daily Amount: 60 mg        IV  toradol PRN ( x 2  7/19)

## 2018-07-19 NOTE — PROGRESS NOTES
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 during variceal surveillance by Dr Jeffries - GI  Fatty liver disease  Elevated alkaline phosphatase, 108 decreased to 98 today  Recommendation:    Patient seems to be doing well, states that he is ready to go home  He discussed with me about his previous hospitalization [facility] when he had elevated ammonia and had issues with alcohol  Since then he has not been drinking and states that he has no issues of withdrawal tremors or any hallucinations  Current level of ammonia was 14  He has been doing well and ambulated to the satisfaction of therapy team that they have cleared him and he would be able to go home later today  He also was evaluated by orthopedic service and they have cleared him for discharge  Medically speaking patient is at baseline continue to improve his health overall follow up with his primary care physician continues proton pump inhibitors and avoid alcohol in the long run  He will need follow-up surveillance of his fatty liver disease which he fully understands through his gastroenterology team     Subjective:     Patient seen and examined today  EMR and overnight events reviewed  Sitting comfortably on the site chair very pleasant gentleman not in any distress states that he has no pain patient demonstrated good flexion and extension at the left knee without any issues  He has no bleeding from the surgical site and states that his pain is well controlled    Patient is quite happy with his results and is looking forward to be discharged later today  Review of Systems     Constitutional: No diaphoresis, fatigue or fever  HEENT: No sore throat  No visual complaints  Respiratory: No cough, shortness of breath and wheezing  Cardiovascular: No chest pain and palpitations  No Syncope or grey out  GI: Negative for abdominal distention, pain, constipation, diarrhea or nausea  Endocrine: Negative for polydipsia and polyuria  Genitourinary: Negative for dysuria, flank pain and urgency  Skin: Negative for rash  Neurological: Negative for numbness and headaches  Hematological: Negative for spontaneous bruising or bleeding  Psychiatric: Negative for confusion, dysphoric mood, hallucinations  All other systems reviewed and are negative  OBJECTIVE:     Vitals:     HR:  [57-82] 76  Resp:  [12-21] 18  BP: (110-144)/(57-88) 119/77  SpO2:  [95 %-100 %] 97 %  Temp (24hrs), Av 3 °F (36 3 °C), Min:96 4 °F (35 8 °C), Max:98 2 °F (36 8 °C)  Current: Temperature: 97 9 °F (36 6 °C)    Intake/Output Summary (Last 24 hours) at 18 1131  Last data filed at 18 0600   Gross per 24 hour   Intake          6239 17 ml   Output             2025 ml   Net          4214 17 ml     Body mass index is 23 36 kg/m²  Physical Exam      General Appearance:  Awake,alert, cooperative  Not in distress  Patient is lean and of average build  Pallor / Icterus/ Cyanosis negative  Oropharynx no thrush  Tongue protrudes in midline  No asterixis no flapping tremors on outstretched hands   Head:  Normocephalic, atraumatic  No titubations  Eyes:  No eye redness or discharge bilaterally  Neck: Supple, no raised JVP  Back:   Symmetric, no kypho-scoliosis  Lungs:   B/L Clear to auscultation, no wheezing  Normal broncho-alveolar air entry  No pleural rubs  Heart:   Regular S1S2, A2P2 normal, no murmur or gallop  Abdomen:   Soft, non-tender,no rebound, bowel sounds+  Musculoskeletal: Extremities no cyanosis or edema  Surgical site on the operated left knee has clean dressing  No discharge or drainage  Mild diminution and tenderness to touch   Psych: Normal Affect / No hallucinations or delusions  Neurologic:               Skin:  Vascular: Awake and alert  Mentation intact  Speech is intact  Facial symmetry is maintained  Good shoulder shrug and hand grasp  Muscle bulk is bilaterally equal   Muscle strength is 5/5 in all muscle groups except on operated left lower limb which is limited due to recent surgery  Light touch and temperature sensation is maintained  No rashes /purpura  No open wounds  Homans sign is negative  B/L Calf are supple and non tender         Labs, Imaging, & Other studies:    All pertinent labs and imaging studies were personally reviewed    Results from last 7 days  Lab Units 07/19/18  0428   HEMOGLOBIN g/dL 12 7       Results from last 7 days  Lab Units 07/19/18  0428   SODIUM mmol/L 137   POTASSIUM mmol/L 4 2   CHLORIDE mmol/L 109*   CO2 mmol/L 20*   ANION GAP mmol/L 8   BUN mg/dL 9   CREATININE mg/dL 0 74   EGFR ml/min/1 73sq m 97   GLUCOSE RANDOM mg/dL 177*   CALCIUM mg/dL 8 3   AST U/L 34   ALT U/L 29   ALK PHOS U/L 98   TOTAL PROTEIN g/dL 5 5*   BILIRUBIN TOTAL mg/dL 0 32           Current Meds:  Current Facility-Administered Medications   Medication Dose Route Frequency Provider Last Rate Last Dose    aluminum-magnesium hydroxide-simethicone (MYLANTA) 200-200-20 mg/5 mL oral suspension 20 mL  20 mL Oral Q6H PRN Lawence MICHAEL Schumacher        aspirin (ECOTRIN LOW STRENGTH) EC tablet 81 mg  81 mg Oral BID Aubrey Villanueva PA-C   81 mg at 07/19/18 0809    calcium carbonate (TUMS) chewable tablet 1,000 mg  1,000 mg Oral Daily PRN Lawence MICHAEL Schumacher        celecoxib (CeleBREX) capsule 200 mg  200 mg Oral Daily Aubrey Villanueva PA-C   200 mg at 07/19/18 0809    fluticasone (FLONASE) 50 mcg/act nasal spray 2 spray  2 spray Each Nare Daily PRN Lawence MICHAEL Schumacher  HYDROmorphone (DILAUDID) injection 0 2 mg  0 2 mg Intravenous Q3H PRN Tamar Villavicencio PA-C        ketorolac (TORADOL) injection 15 mg  15 mg Intravenous Q6H PRN Tamar Villavicencio PA-C   15 mg at 07/19/18 1034    lactated ringers infusion  125 mL/hr Intravenous Continuous Tamar Villavicencio PA-C   Stopped at 07/19/18 7848    loratadine (CLARITIN) tablet 10 mg  10 mg Oral Daily PRN Tamar Villavicencio PA-C        methocarbamol (ROBAXIN) tablet 500 mg  500 mg Oral Q6H PRN Tamar Villavicencio PA-C   500 mg at 07/18/18 2005    multivitamin-minerals (CENTRUM) tablet 1 tablet  1 tablet Oral Daily Tamar Villavicencio PA-C   1 tablet at 07/19/18 0809    oxyCODONE (ROXICODONE) immediate release tablet 10 mg  10 mg Oral Q4H PRN Tamar Villavicencio PA-C   10 mg at 07/19/18 1033    oxyCODONE (ROXICODONE) IR tablet 5 mg  5 mg Oral Q4H PRN Tamar Villavicencio PA-C   5 mg at 07/18/18 1833    pantoprazole (PROTONIX) EC tablet 40 mg  40 mg Oral BID AC Alberto Robertson MD   40 mg at 07/19/18 4993    senna (SENOKOT) tablet 8 6 mg  1 tablet Oral Daily Tamar Villavicencio PA-C   8 6 mg at 07/19/18 0809    sodium chloride 0 9 % infusion  125 mL/hr Intravenous Continuous Rodney Paredes DO   Stopped at 07/18/18 1834     Home Meds:  Prescriptions Prior to Admission   Medication    chlorhexidine (HIBICLENS) 4 % external liquid    Ergocalciferol (VITAMIN D2 PO)    lactulose 20 g/30 mL    pantoprazole (PROTONIX) 40 mg tablet    fluticasone (FLONASE) 50 mcg/act nasal spray    loratadine (CLARITIN) 10 mg tablet       Continuous Infusions:    lactated ringers 125 mL/hr Last Rate: Stopped (07/19/18 0752)   sodium chloride 125 mL/hr Last Rate: Stopped (07/18/18 1834)       VTE Pharmacologic Prophylaxis:  as per Primary Ortho Team   VTE Mechanical Prophylaxis: sequential compression device    Portions of the record may have been created with voice recognition software   Occasional wrong word or "sound a like" substitutions may have occurred due to the inherent limitations of voice recognition software  Read the chart carefully and recognize, using context, where substitutions have occurred

## 2018-07-19 NOTE — PLAN OF CARE

## 2021-11-26 ENCOUNTER — DOCTOR'S OFFICE (OUTPATIENT)
Dept: URBAN - NONMETROPOLITAN AREA CLINIC 1 | Facility: CLINIC | Age: 71
Setting detail: OPHTHALMOLOGY
End: 2021-11-26
Payer: COMMERCIAL

## 2021-11-26 DIAGNOSIS — H27.8: ICD-10-CM

## 2021-11-26 DIAGNOSIS — H35.373: ICD-10-CM

## 2021-11-26 DIAGNOSIS — H40.011: ICD-10-CM

## 2021-11-26 DIAGNOSIS — H43.813: ICD-10-CM

## 2021-11-26 DIAGNOSIS — H26.491: ICD-10-CM

## 2021-11-26 DIAGNOSIS — H33.312: ICD-10-CM

## 2021-11-26 DIAGNOSIS — H40.012: ICD-10-CM

## 2021-11-26 DIAGNOSIS — H43.821: ICD-10-CM

## 2021-11-26 PROCEDURE — 76514 ECHO EXAM OF EYE THICKNESS: CPT | Performed by: OPHTHALMOLOGY

## 2021-11-26 PROCEDURE — 92250 FUNDUS PHOTOGRAPHY W/I&R: CPT | Performed by: OPHTHALMOLOGY

## 2021-11-26 PROCEDURE — 99204 OFFICE O/P NEW MOD 45 MIN: CPT | Performed by: OPHTHALMOLOGY

## 2021-11-26 PROCEDURE — 92134 CPTRZ OPH DX IMG PST SGM RTA: CPT | Performed by: OPHTHALMOLOGY

## 2021-11-26 ASSESSMENT — REFRACTION_AUTOREFRACTION
OS_SPHERE: +1.25
OD_AXIS: 028
OS_AXIS: 011
OS_CYLINDER: -1.00
OD_CYLINDER: -0.50
OD_SPHERE: +0.75

## 2021-11-26 ASSESSMENT — SPHEQUIV_DERIVED
OS_SPHEQUIV: 0.75
OD_SPHEQUIV: 0.5

## 2021-11-26 ASSESSMENT — VISUAL ACUITY
OS_BCVA: 20/30-1
OD_BCVA: 20/30

## 2021-11-26 ASSESSMENT — CONFRONTATIONAL VISUAL FIELD TEST (CVF)
OS_FINDINGS: FULL
OD_FINDINGS: FULL

## 2021-11-30 ENCOUNTER — AMBUL SURGICAL CARE (OUTPATIENT)
Dept: URBAN - NONMETROPOLITAN AREA SURGERY 1 | Facility: SURGERY | Age: 71
Setting detail: OPHTHALMOLOGY
End: 2021-11-30
Payer: COMMERCIAL

## 2021-11-30 DIAGNOSIS — H26.491: ICD-10-CM

## 2021-11-30 PROBLEM — H27.9 PSEUDOPHAKIA: Status: ACTIVE | Noted: 2021-11-26

## 2021-11-30 PROBLEM — H40.011: Status: ACTIVE | Noted: 2021-11-26

## 2021-11-30 PROBLEM — H27.8 PSEUDOPHAKIA: Status: ACTIVE | Noted: 2021-11-26

## 2021-11-30 PROBLEM — H43.821 VITREOMACULAR ADHESION; RIGHT EYE: Status: ACTIVE | Noted: 2021-11-26

## 2021-11-30 PROBLEM — H33.312: Status: ACTIVE | Noted: 2021-11-26

## 2021-11-30 PROBLEM — H35.373 ERM; BOTH EYES: Status: ACTIVE | Noted: 2021-11-26

## 2021-11-30 PROBLEM — H40.012: Status: ACTIVE | Noted: 2021-11-26

## 2021-11-30 PROBLEM — H43.813 POSTERIOR VITREOUS DETACHMENT; BOTH EYES: Status: ACTIVE | Noted: 2021-11-26

## 2021-11-30 PROCEDURE — G8907 PT DOC NO EVENTS ON DISCHARG: HCPCS | Performed by: OPHTHALMOLOGY

## 2021-11-30 PROCEDURE — G8918 PT W/O PREOP ORDER IV AB PRO: HCPCS | Performed by: OPHTHALMOLOGY

## 2021-11-30 PROCEDURE — 66821 AFTER CATARACT LASER SURGERY: CPT | Performed by: OPHTHALMOLOGY

## 2021-11-30 PROCEDURE — G8907 PT DOC NO EVENTS ON DISCHARG: HCPCS | Performed by: CLINIC/CENTER

## 2021-11-30 PROCEDURE — 66821 AFTER CATARACT LASER SURGERY: CPT | Performed by: CLINIC/CENTER

## 2021-11-30 PROCEDURE — G8918 PT W/O PREOP ORDER IV AB PRO: HCPCS | Performed by: CLINIC/CENTER

## 2022-04-23 ENCOUNTER — APPOINTMENT (EMERGENCY)
Dept: RADIOLOGY | Facility: HOSPITAL | Age: 72
End: 2022-04-23
Payer: MEDICARE

## 2022-04-23 ENCOUNTER — HOSPITAL ENCOUNTER (EMERGENCY)
Facility: HOSPITAL | Age: 72
Discharge: HOME | End: 2022-04-23
Attending: EMERGENCY MEDICINE | Admitting: EMERGENCY MEDICINE
Payer: MEDICARE

## 2022-04-23 VITALS
DIASTOLIC BLOOD PRESSURE: 90 MMHG | TEMPERATURE: 97 F | HEIGHT: 65 IN | HEART RATE: 76 BPM | RESPIRATION RATE: 18 BRPM | OXYGEN SATURATION: 98 % | BODY MASS INDEX: 22.52 KG/M2 | WEIGHT: 135.14 LBS | SYSTOLIC BLOOD PRESSURE: 138 MMHG

## 2022-04-23 DIAGNOSIS — M54.31 SCIATICA OF RIGHT SIDE: ICD-10-CM
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
## 2022-04-23 DIAGNOSIS — S70.01XA CONTUSION OF RIGHT HIP, INITIAL ENCOUNTER: ICD-10-CM

## 2022-04-23 DIAGNOSIS — I70.90 ATHEROSCLEROSIS: ICD-10-CM

## 2022-04-23 PROCEDURE — 72100 X-RAY EXAM L-S SPINE 2/3 VWS: CPT

## 2022-04-23 PROCEDURE — 99283 EMERGENCY DEPT VISIT LOW MDM: CPT

## 2022-04-23 PROCEDURE — 99284 EMERGENCY DEPT VISIT MOD MDM: CPT | Performed by: EMERGENCY MEDICINE

## 2022-04-23 PROCEDURE — 73590 X-RAY EXAM OF LOWER LEG: CPT

## 2022-04-23 PROCEDURE — 73502 X-RAY EXAM HIP UNI 2-3 VIEWS: CPT

## 2022-04-23 PROCEDURE — 73610 X-RAY EXAM OF ANKLE: CPT

## 2022-04-23 PROCEDURE — 96372 THER/PROPH/DIAG INJ SC/IM: CPT

## 2022-04-23 RX ORDER — KETOROLAC TROMETHAMINE 30 MG/ML
15 INJECTION, SOLUTION INTRAMUSCULAR; INTRAVENOUS ONCE
Status: COMPLETED | OUTPATIENT
Start: 2022-04-23 | End: 2022-04-23

## 2022-04-23 RX ORDER — NAPROXEN 500 MG/1
500 TABLET ORAL 2 TIMES DAILY PRN
Qty: 30 TABLET | Refills: 0 | Status: SHIPPED | OUTPATIENT
Start: 2022-04-23

## 2022-04-23 RX ADMIN — KETOROLAC TROMETHAMINE 15 MG: 30 INJECTION, SOLUTION INTRAMUSCULAR at 10:36

## 2022-04-23 NOTE — ED PROVIDER NOTES
History  Chief Complaint   Patient presents with    Fall     pt fell but is unknown is passed out  pt had a few drinks prior  No blood thinner  pt is complaining today of right hip pain and numbness in the lefg and the toes      Patient is a very pleasant 72-year-old male who presents emergency room with chief complaint of right hip and right back pain  Patient reports to me that he is unsure how he fell last evening but thinks he may have tripped over shoes in his bedroom and landed on his right side  His wife who presents to the emergency room with him reports that she needed to help him get up because he was unable to do so secondary to the pain  Patient denies striking his head  Did not strike his chest or his abdomen  He does not have any pain in his upper extremities nor does he have any pain is left lower extremity  Patient reports that his right leg feels numb from his hip on down to his foot  He reports he feels if his toes do not want to move    Patient reports severe pain with movement of the right lower extremity  He has been able to ambulate upon it with some degree of discomfort  Patient does report a degree of what he describes as depression as well as having increased the amount of alcohol he drinks  He was drinking when the event occurred last evening        History provided by:  Patient  Fall  Mechanism of injury: fall    Injury location:  Leg  Leg injury location:  R hip and R leg  Incident location:  Home  Time since incident:  16 hours  Arrived directly from scene: yes    Fall:     Fall occurred:  Walking and tripped    Impact surface:  Hard floor    Point of impact:  Unable to specify  Suspicion of alcohol use: yes    Suspicion of drug use: no    Prior to arrival data:     Bystander interventions:  None    Patient ambulatory at scene: yes      Blood loss:  None    Responsiveness at scene:  Alert    Orientation at scene:  Person, place, situation and time    Loss of consciousness: no      Amnesic to event: no    Associated symptoms: back pain    Associated symptoms: no abdominal pain, no blindness, no chest pain, no difficulty breathing, no headaches, no loss of consciousness, no nausea, no neck pain, no seizures and no vomiting        Prior to Admission Medications   Prescriptions Last Dose Informant Patient Reported? Taking?    Ergocalciferol (VITAMIN D2 PO) Not Taking at Unknown time  Yes No   Sig: Take 50,000 Int'l Units by mouth once a week   Patient not taking: Reported on 2022    aspirin (ECOTRIN LOW STRENGTH) 81 mg EC tablet Past Week at Unknown time  No Yes   Sig: Take 1 tablet (81 mg total) by mouth 2 (two) times a day   celecoxib (CeleBREX) 200 mg capsule Not Taking at Unknown time  No No   Sig: Take 1 capsule (200 mg total) by mouth daily   Patient not taking: Reported on 2022    fluticasone (FLONASE) 50 mcg/act nasal spray Not Taking at Unknown time  Yes No   Si sprays into each nostril daily as needed     Patient not taking: Reported on 2022    lactulose 20 g/30 mL Not Taking at Unknown time  Yes No   Sig: Take by mouth daily as needed   Patient not taking: Reported on 2022    loratadine (CLARITIN) 10 mg tablet Past Month at Unknown time  Yes Yes   Sig: Take 10 mg by mouth daily as needed     methocarbamol (ROBAXIN) 500 mg tablet Not Taking at Unknown time  No No   Sig: Take 1 tablet (500 mg total) by mouth every 6 (six) hours as needed for muscle spasms   Patient not taking: Reported on 2022    pantoprazole (PROTONIX) 40 mg tablet 2022 at Unknown time  Yes Yes   Sig: Take 40 mg by mouth daily        Facility-Administered Medications: None       Past Medical History:   Diagnosis Date    Fatty liver     Food allergy     strawberries- tickling in throat    GERD (gastroesophageal reflux disease)     OA (osteoarthritis)     left knee    Seasonal allergies     Wears glasses        Past Surgical History:   Procedure Laterality Date  CATARACT EXTRACTION Bilateral     ESOPHAGOGASTRODUODENOSCOPY      KNEE ARTHROSCOPY Left     LIVER BIOPSY      TX TOTAL KNEE ARTHROPLASTY Left 2018    Procedure: ARTHROPLASTY KNEE TOTAL;  Surgeon: Pilar Quezada MD;  Location: [facility];  Service: Orthopedics       History reviewed  No pertinent family history  I have reviewed and agree with the history as documented  E-Cigarette/Vaping    E-Cigarette Use Never User      E-Cigarette/Vaping Substances     Social History     Tobacco Use    Smoking status: Former Smoker     Quit date: 7/10/1976     Years since quittin 8    Smokeless tobacco: Never Used   Vaping Use    Vaping Use: Never used   Substance Use Topics    Alcohol use: Yes     Alcohol/week: 3 0 standard drinks     Types: 3 Cans of beer per week     Comment: 2-3 drinks day beer    Drug use: No       Review of Systems   Constitutional: Negative  Negative for activity change, fatigue and fever  HENT: Negative  Negative for congestion, ear pain, rhinorrhea, sinus pressure and sore throat  Eyes: Negative  Negative for blindness  Respiratory: Negative  Negative for cough, choking, chest tightness, shortness of breath and wheezing  Hiccups     Cardiovascular: Negative  Negative for chest pain, palpitations and leg swelling  Gastrointestinal: Negative  Negative for abdominal pain, diarrhea, nausea and vomiting  Endocrine: Negative  Genitourinary: Negative  Negative for difficulty urinating, dysuria, flank pain and frequency  Musculoskeletal: Positive for back pain and gait problem  Negative for arthralgias, myalgias and neck pain  Skin: Positive for wound  Negative for pallor and rash  Allergic/Immunologic: Negative  Neurological: Negative for dizziness, seizures, loss of consciousness, weakness and headaches  Hematological: Negative  Psychiatric/Behavioral: Negative  All other systems reviewed and are negative        Physical Exam  Physical Exam  Vitals and nursing note reviewed  Constitutional:       General: He is in acute distress  Appearance: Normal appearance  He is well-developed and normal weight  He is not toxic-appearing  HENT:      Head: Normocephalic and atraumatic  Hair is normal       Jaw: No pain on movement  Right Ear: External ear normal       Left Ear: External ear normal       Nose: Nose normal       Mouth/Throat:      Mouth: Mucous membranes are moist       Pharynx: Oropharynx is clear  Eyes:      General: Lids are normal  No scleral icterus  Extraocular Movements: Extraocular movements intact  Conjunctiva/sclera: Conjunctivae normal       Pupils: Pupils are equal, round, and reactive to light  Cardiovascular:      Rate and Rhythm: Normal rate and regular rhythm  Heart sounds: Normal heart sounds  No murmur heard  Pulmonary:      Effort: Pulmonary effort is normal  No respiratory distress  Breath sounds: Normal breath sounds  No decreased breath sounds, wheezing, rhonchi or rales  Abdominal:      General: Abdomen is flat  There is no distension  Palpations: Abdomen is soft  Abdomen is not rigid  Tenderness: There is no abdominal tenderness  Musculoskeletal:         General: Tenderness and signs of injury present  No deformity  Normal range of motion  Cervical back: Normal, normal range of motion and neck supple  Thoracic back: Normal       Lumbar back: Tenderness present  No bony tenderness  Back:       Right hip: Tenderness present  Right upper leg: Normal       Right knee: Normal       Right lower leg: Tenderness present  No edema  Left lower leg: No edema  Legs:    Skin:     General: Skin is warm and dry  Coloration: Skin is not pale  Findings: No rash  Neurological:      General: No focal deficit present  Mental Status: He is alert and oriented to person, place, and time  Mental status is at baseline     Psychiatric: Attention and Perception: Attention normal          Mood and Affect: Mood normal          Speech: Speech normal          Behavior: Behavior normal          Vital Signs  ED Triage Vitals [04/23/22 1013]   Temperature Pulse Respirations Blood Pressure SpO2   (!) 96 6 °F (35 9 °C) 105 16 147/93 99 %      Temp Source Heart Rate Source Patient Position - Orthostatic VS BP Location FiO2 (%)   Temporal -- Lying Left arm --      Pain Score       7           Vitals:    04/23/22 1013   BP: 147/93   Pulse: 105   Patient Position - Orthostatic VS: Lying         Visual Acuity  Visual Acuity      Most Recent Value   L Pupil Size (mm) 3   R Pupil Size (mm) 3          ED Medications  Medications   ketorolac (TORADOL) injection 15 mg (15 mg Intramuscular Given 4/23/22 1036)       Diagnostic Studies  Results Reviewed     None                 XR ankle 3+ views RIGHT   ED Interpretation by Isaura Jamil DO (04/23 1130)   Calcified artery  No acute fracture  XR hip/pelv 2-3 vws right if performed   ED Interpretation by Isaura Jamil DO (04/23 1106)   Negative for acute fracture      XR spine lumbar 2 or 3 views injury   ED Interpretation by Isaura Jamil DO (04/23 1106)   Degenerative changes without any evidence of acute fracture      XR tibia fibula 2 views RIGHT   ED Interpretation by Isaura Jamil DO (04/23 1106)   No acute fracture                 Procedures  Procedures         ED Course  ED Course as of 04/23/22 1132   Sat Apr 23, 2022   1027 I did discuss with the patient about his report of depression and drinking  Patient does not wish to pursue any resources for this at this time  1106 No obvious fracture of bony structures imaged  Questionable changes near the distal right fibula  Will obtain ankle film  1117 Discussed findings with wife and  at bedside  Reviewed x-ray films  Likely sciatica, likely contusion  Recommended follow-up with PCP or spine    Wife would like patient to follow-up with pain management in the medical office building  Will provide a referral for same  SBIRT 22yo+      Most Recent Value   SBIRT (24 yo +)    In order to provide better care to our patients, we are screening all of our patients for alcohol and drug use  Would it be okay to ask you these screening questions? Yes Filed at: 04/23/2022 1011   Initial Alcohol Screen: US AUDIT-C     1  How often do you have a drink containing alcohol? 0 Filed at: 04/23/2022 1011   2  How many drinks containing alcohol do you have on a typical day you are drinking? 0 Filed at: 04/23/2022 1011   3a  Male UNDER 65: How often do you have five or more drinks on one occasion? 0 Filed at: 04/23/2022 1011   3b  FEMALE Any Age, or MALE 65+: How often do you have 4 or more drinks on one occassion? 0 Filed at: 04/23/2022 1011   Audit-C Score 0 Filed at: 04/23/2022 1011   AMADOU: How many times in the past year have you    Used an illegal drug or used a prescription medication for non-medical reasons? Never Filed at: 04/23/2022 1011                    MDM  Number of Diagnoses or Management Options  Atherosclerosis: new and requires workup  Contusion of right hip, initial encounter: new and requires workup  Fall, initial encounter: new and requires workup  Sciatica of right side: new and requires workup  Diagnosis management comments: I also discussed with the patient his evidence atherosclerotic disease  Recommended follow-up with PCP         Amount and/or Complexity of Data Reviewed  Tests in the radiology section of CPT®: ordered and reviewed  Independent visualization of images, tracings, or specimens: yes    Risk of Complications, Morbidity, and/or Mortality  Presenting problems: moderate  Diagnostic procedures: moderate  Management options: moderate    Patient Progress  Patient progress: improved      Disposition  Final diagnoses:   Fall, initial encounter   Contusion of right hip, initial encounter   Sciatica of right side   Atherosclerosis     Time reflects when diagnosis was documented in both MDM as applicable and the Disposition within this note     Time User Action Codes Description Comment    4/23/2022 11:15 AM Remonia Pittston Add [W19  XXXA] Fall, initial encounter     4/23/2022 11:15 AM Fatou Felder Add [S70 01XA] Contusion of right hip, initial encounter     4/23/2022 11:15 AM Remonia Pittston Add [M54 31] Sciatica of right side     4/23/2022 11:31 AM Remonia Pittston Add [I70 90] Atherosclerosis       ED Disposition     ED Disposition Condition Date/Time Comment    Discharge Stable Sat Apr 23, 2022 11:17 AM Nichole Najera discharge to home/self care              Follow-up Information     Follow up With Specialties Details Why Contact Info    Ofe Alicea MD Family Medicine In 2 weeks As needed [address]      Kyle Oates MD Pain Medicine In 1 week Even in well [address] [place] [address]  [phone]            Patient's Medications   Discharge Prescriptions    NAPROXEN (NAPROSYN) 500 MG TABLET    Take 1 tablet (500 mg total) by mouth 2 (two) times a day as needed for mild pain or moderate pain       Start Date: 4/23/2022 End Date: --       Order Dose: 500 mg       Quantity: 30 tablet    Refills: 0           PDMP Review     None          ED Provider  Electronically Signed by           Teresa Terry DO  04/23/22 1442

## 2022-04-23 NOTE — DISCHARGE INSTRUCTIONS
Please take medication as prescribed  Please follow-up with either your primary care provider or [facility] in the medical Office building at \[facility]\""  We also recommend applying ice to the areas that are tender 4 to 6 times a day for 15 to 20 minutes at a time  Return with any worsening  Your imaging studies have been preliminarily reviewed by the emergency department  Further review by Radiology is pending at this time  If there is a discrepancy or a finding of additional concern identified, we will attempt to contact you at the number you have provided us  If you do not hear from us, follow-up with your primary care provider within 1-2 weeks is always recommended to ensure that all findings were normal or as initially reported    Your results may also be available on [email or web address]

## 2022-04-27 ENCOUNTER — TELEPHONE (OUTPATIENT)
Dept: PHYSICAL THERAPY | Facility: OTHER | Age: 72
End: 2022-04-27

## 2022-04-27 NOTE — TELEPHONE ENCOUNTER
Nurse reached out to discuss recent referral entered for  Comprehensive Sp[facility] program and offerings  Nurse unable to [facility] for pt as preferred number rings only  No VM option to LM  Referral deferred for f/u attempt    Appears [facility] also reached out to pt

## 2023-09-05 RX ORDER — FOLIC ACID 1 MG/1
1 TABLET ORAL DAILY
COMMUNITY
Start: 2023-07-18

## 2023-09-05 RX ORDER — TAMSULOSIN HYDROCHLORIDE 0.4 MG/1
1 CAPSULE ORAL EVERY EVENING
COMMUNITY
Start: 2023-07-18

## 2023-09-05 RX ORDER — ATORVASTATIN CALCIUM 40 MG/1
40 TABLET, FILM COATED ORAL DAILY
COMMUNITY
Start: 2023-08-04 | End: 2024-08-03

## 2023-09-05 RX ORDER — DIPHENOXYLATE HYDROCHLORIDE AND ATROPINE SULFATE 2.5; .025 MG/1; MG/1
1 TABLET ORAL DAILY
COMMUNITY

## 2023-09-05 RX ORDER — ONDANSETRON 4 MG/1
TABLET, FILM COATED ORAL
COMMUNITY
Start: 2023-05-22

## 2023-09-05 RX ORDER — NALTREXONE HYDROCHLORIDE 50 MG/1
50 TABLET, FILM COATED ORAL DAILY
COMMUNITY
Start: 2023-07-18 | End: 2024-07-17

## 2023-09-05 RX ORDER — LANOLIN ALCOHOL/MO/W.PET/CERES
1 CREAM (GRAM) TOPICAL DAILY
COMMUNITY
Start: 2023-07-18

## 2023-09-05 RX ORDER — TRAMADOL HYDROCHLORIDE 50 MG/1
50 TABLET ORAL
COMMUNITY

## 2023-09-08 ENCOUNTER — CONSULT (OUTPATIENT)
Dept: PAIN MEDICINE | Facility: CLINIC | Age: 73
End: 2023-09-08
Payer: MEDICARE

## 2023-09-08 VITALS
TEMPERATURE: 96.1 F | OXYGEN SATURATION: 98 % | BODY MASS INDEX: 21.56 KG/M2 | SYSTOLIC BLOOD PRESSURE: 102 MMHG | DIASTOLIC BLOOD PRESSURE: 66 MMHG | WEIGHT: 129.4 LBS | HEART RATE: 72 BPM | HEIGHT: 65 IN

## 2023-09-08 DIAGNOSIS — M54.12 CERVICAL RADICULOPATHY: Primary | ICD-10-CM

## 2023-09-08 DIAGNOSIS — M47.812 CERVICAL SPONDYLOSIS: ICD-10-CM

## 2023-09-08 DIAGNOSIS — M47.816 LUMBAR SPONDYLOSIS: ICD-10-CM

## 2023-09-08 PROBLEM — M54.16 LUMBAR RADICULOPATHY: Status: ACTIVE | Noted: 2023-09-08

## 2023-09-08 PROCEDURE — 99205 OFFICE O/P NEW HI 60 MIN: CPT | Performed by: ANESTHESIOLOGY

## 2023-09-08 RX ORDER — PANTOPRAZOLE SODIUM 20 MG/1
20 TABLET, DELAYED RELEASE ORAL DAILY
COMMUNITY
Start: 2023-07-10

## 2023-09-08 RX ORDER — 0.9 % SODIUM CHLORIDE 0.9 %
1 VIAL (ML) INJECTION ONCE
OUTPATIENT
Start: 2023-09-08 | End: 2023-09-08

## 2023-09-08 RX ORDER — LIDOCAINE HYDROCHLORIDE 10 MG/ML
5 INJECTION, SOLUTION EPIDURAL; INFILTRATION; INTRACAUDAL; PERINEURAL ONCE
OUTPATIENT
Start: 2023-09-08 | End: 2023-09-08

## 2023-09-08 RX ORDER — UBIDECARENONE 75 MG
100 CAPSULE ORAL DAILY
COMMUNITY
Start: 2023-07-18

## 2023-09-08 RX ORDER — GABAPENTIN 300 MG/1
300 CAPSULE ORAL 3 TIMES DAILY
Qty: 90 CAPSULE | Refills: 2 | Status: SHIPPED | OUTPATIENT
Start: 2023-09-08

## 2023-09-08 RX ORDER — ASPIRIN 81 MG
81 TABLET,CHEWABLE ORAL DAILY
COMMUNITY
Start: 2023-08-04

## 2023-09-08 RX ORDER — METHYLPREDNISOLONE ACETATE 80 MG/ML
80 INJECTION, SUSPENSION INTRA-ARTICULAR; INTRALESIONAL; INTRAMUSCULAR; PARENTERAL; SOFT TISSUE ONCE
OUTPATIENT
Start: 2023-09-08 | End: 2023-09-08

## 2023-09-08 NOTE — PROGRESS NOTES
Assessment  1. Cervical radiculopathy - Left  -     gabapentin (NEURONTIN) 300 mg capsule; Take 1 capsule (300 mg total) by mouth 3 (three) times a day  -     Ambulatory referral to Physical Therapy; Future  -     Case request operating room: BLOCK / INJECTION EPIDURAL STEROID CERVICAL C7-T1 or alternate level; Standing  -     Case request operating room: BLOCK / INJECTION EPIDURAL STEROID CERVICAL C7-T1 or alternate level    2. Cervical spondylosis - Bilateral  -     gabapentin (NEURONTIN) 300 mg capsule; Take 1 capsule (300 mg total) by mouth 3 (three) times a day  -     Ambulatory referral to Physical Therapy; Future  -     Case request operating room: BLOCK / INJECTION EPIDURAL STEROID CERVICAL C7-T1 or alternate level; Standing  -     Case request operating room: BLOCK / INJECTION EPIDURAL STEROID CERVICAL C7-T1 or alternate level    3. Lumbar spondylosis  -     Ambulatory referral to Physical Therapy; Future    Left sided cervical radicular pain in C6 dermatomal distribution accompanied by pain limited weakness numbness and paresthesias. Patient has not been a full participant with PT. Chronic pain with decreased participation with IADLs over the past few years. Has been taking NSAIDs and tramadol infrequently with modest benefit. 5/5 strength bilaterally in upper extremities with AROM, positive spurling's maneuver, left sided. Additionally there is positive cervical facet loading eliciting pain, left greater than right. Negative Mccall's, denies any gait instability, saddle anesthesia. On MRI imaging Scattered multilevel spondylotic changes with mild degree of central canal stenosis with severe transforaminal stenosis C4-C7. No cord compression or cord signal abnormality. Risks, benefits alternatives to epidural steroid injections thoroughly discussed with patient. Handouts provided questions answered to patient's satisfaction.   Lifestyle modifications extensively discussed including sleep and neck posture, in conjunction with PT. Will proceed with multimodal pain therapy plan as noted below:    Axial low back pain described primarily by arthritic features. Aching, nagging, indolent, stabbing, throbbing features in axial low back without radicular components. 5/5 strength bilaterally, negative SLR. Positive facet loading maneuvers in lumbar spine elicited pain, positive tenderness to palpation over lumbar paraspinal muscles. Findings correlate with prominent lumbar facet arthropathy seen throughout axial low back on x-ray. Currently he is neurologically intact without gait instability, saddle anesthesia or bowel/bladder abnormality. Risks, benefits alternative to medial branch blocks and subsequent radiofrequency ablation of successful thoroughly discussed with patient. Handouts provided questions answered to patient satisfaction. Plan  -C7-T1 LESI; f/u 2 weeks post procedure  -gabapentin 300 mg t.i.d. Ordered for patient; counseled regarding sedative effects of taking this medication and provided up titration calendar. Counseled not to take medication while driving or operating heavy machinery/using stairs  -script provided for formal physical therapy for right-sided lumbar radiculopathy; Physician directed home exercise plan as per [employer] demonstrated and handouts provided that patient plans to participate with for 1 hour, twice a week for the next 6 weeks. There are risks associated with opioid medications, including dependence, addiction and tolerance. The patient understands and agrees to use these medications only as prescribed. Potential side effects of the medications include, but are not limited to, constipation, drowsiness, addiction, impaired judgment and risk of fatal overdose if not taken as prescribed. The patient was warned against driving while taking sedation medications or operating heavy machinery. The patient voiced understanding. Sharing medications is a felony.  At this point in time, the patient is showing no signs of addiction, abuse, diversion or suicidal ideation. [place] Prescription Drug Monitoring Program report was reviewed and was appropriate      Complete risks and benefits including bleeding, infection, tissue reaction, nerve injury and allergic reaction were discussed. The approach was demonstrated using models and literature was provided. Verbal and written consent was obtained. My impressions and treatment recommendations were discussed in detail with the patient who verbalized understanding and had no further questions. Discharge instructions were provided. I personally saw and examined the patient and I agree with the above discussed plan of care.     New Medications Ordered This Visit   Medications   • multivitamin (THERAGRAN) TABS     Sig: Take 1 tablet by mouth daily   • atorvastatin (LIPITOR) 40 mg tablet     Sig: Take 40 mg by mouth daily   • cyanocobalamin (VITAMIN B-12) 1000 MCG tablet     Sig: Take 1 tablet by mouth daily   • folic acid (FOLVITE) 1 mg tablet     Sig: Take 1 tablet by mouth daily   • metoprolol tartrate (LOPRESSOR) 25 mg tablet     Sig: TAKE 1/2 OF A TABLET (12.5 MG TOTAL) BY MOUTH TWICE A DAY   • naltrexone (REVIA) 50 mg tablet     Sig: Take 50 mg by mouth daily   • ondansetron (ZOFRAN) 4 mg tablet     Sig: TAKE 1 TABLET BY MOUTH DAILY AS NEEDED FOR NAUSEA OR VOMITING   • tamsulosin (FLOMAX) 0.4 mg     Sig: Take 1 capsule by mouth every evening   • thiamine 100 MG tablet     Sig: Take 1 tablet by mouth daily   • traMADol (ULTRAM) 50 mg tablet     Sig: Take 50 mg by mouth   • Aspirin Low Dose 81 MG chewable tablet     Sig: Chew 81 mg daily   • cyanocobalamin (VITAMIN B-12) 100 mcg tablet     Sig: Take 100 mcg by mouth daily   • pantoprazole (PROTONIX) 20 mg tablet     Sig: Take 20 mg by mouth daily   • gabapentin (NEURONTIN) 300 mg capsule     Sig: Take 1 capsule (300 mg total) by mouth 3 (three) times a day     Dispense:  90 capsule     Refill:  2       History of Present Illness    Karen Recinos is a 81 y.o. male with pmxh of HTN, Zahra Parcel, GERD presenting with left-sided neck pain described primarily as radicular nature. The pain radiates in the C6 dermatomal distributions and is primarily left-sided. The pain contributes to significant disability in participation with independent activities of daily living and is accompanied by weakness numbness and paresthesias that are debilitating in nature. The patient describes that overhead maneuvers such as combing hair is significantly limiting with respect to strength in the left arm/hand. The patient notes significant weakness with left hand  as well at times. The patient has not been to physical therapy. He has trialed conservative measures including naproxen and tylenol for the pain but has not trialed any steroids. He has never had interventional pain procedures in the past including any cervical interlaminar epidural steroid injections in the past for this pain. Denies any gait abnormality, saddle anesthesia or bowel/bladder abnormality. Additionally achy, nagging, indolent, crampy, stabbing pain in his axial low back. The patient describes that the pain is worse with standing for long periods of time on hard surfaces as well as with walking. The patient is a very active individual and feels as though this pain compromises his participation with independent activities of daily living. The pain can be debilitating at times and contribute to significant disability, compromising overall activity and independent activities of daily living. He has not been to PT  Medications the patient has tried in the past include nsaids, tylenol. He describes no radicular symptoms and has good strength. He denies any weakness numbness or paresthesias. The patient denies any bowel or bladder dysfunction, saddle anesthesia or gait instability as well.       I have personally reviewed and/or updated the patient's past medical history, past surgical history, family history, social history, current medications, allergies, and vital signs today. Review of Systems   Constitutional: Positive for activity change. HENT: Negative. Eyes: Negative. Respiratory: Negative. Cardiovascular: Negative. Gastrointestinal: Negative. Endocrine: Negative. Genitourinary: Negative. Musculoskeletal: Positive for arthralgias, back pain, myalgias, neck pain and neck stiffness. Negative for gait problem. Skin: Negative. Allergic/Immunologic: Negative. Neurological: Positive for weakness and numbness. Hematological: Negative. Psychiatric/Behavioral: Negative. All other systems reviewed and are negative. Patient Active Problem List   Diagnosis   • Cervical spondylosis - Bilateral   • Cervical radiculopathy - Left   • Lumbar spondylosis   • Lumbar radiculopathy       Past Medical History:   Diagnosis Date   • Fatty liver    • Food allergy     strawberries- tickling in throat   • GERD (gastroesophageal reflux disease)    • OA (osteoarthritis)     left knee   • Seasonal allergies    • Wears glasses        Past Surgical History:   Procedure Laterality Date   • CATARACT EXTRACTION Bilateral    • ESOPHAGOGASTRODUODENOSCOPY     • KNEE ARTHROSCOPY Left    • LIVER BIOPSY     • MA ARTHRP KNE CONDYLE&PLATU MEDIAL&LAT COMPARTMENTS Left 2018    Procedure: ARTHROPLASTY KNEE TOTAL;  Surgeon: Carmen Sadler MD;  Location: [facility] OR;  Service: Orthopedics   • US GUIDED LIVER BIOPSY  2022       History reviewed. No pertinent family history. Social History     Occupational History   • Not on file   Tobacco Use   • Smoking status: Former     Types: Cigarettes     Quit date: 7/10/1976     Years since quittin.1   • Smokeless tobacco: Never   Vaping Use   • Vaping Use: Never used   Substance and Sexual Activity   • Alcohol use:  Yes     Alcohol/week: 3.0 standard drinks of alcohol     Types: 3 Cans of beer per week     Comment: 2-3 drinks day beer   • Drug use: No   • Sexual activity: Yes       Current Outpatient Medications on File Prior to Visit   Medication Sig   • aspirin (ECOTRIN LOW STRENGTH) 81 mg EC tablet Take 1 tablet (81 mg total) by mouth 2 (two) times a day   • atorvastatin (LIPITOR) 40 mg tablet Take 40 mg by mouth daily   • cyanocobalamin (VITAMIN B-12) 100 mcg tablet Take 100 mcg by mouth daily   • fluticasone (FLONASE) 50 mcg/act nasal spray 2 sprays into each nostril daily as needed   • folic acid (FOLVITE) 1 mg tablet Take 1 tablet by mouth daily   • loratadine (CLARITIN) 10 mg tablet Take 10 mg by mouth daily as needed     • metoprolol tartrate (LOPRESSOR) 25 mg tablet TAKE 1/2 OF A TABLET (12.5 MG TOTAL) BY MOUTH TWICE A DAY   • multivitamin (THERAGRAN) TABS Take 1 tablet by mouth daily   • naltrexone (REVIA) 50 mg tablet Take 50 mg by mouth daily   • naproxen (NAPROSYN) 500 mg tablet Take 1 tablet (500 mg total) by mouth 2 (two) times a day as needed for mild pain or moderate pain   • ondansetron (ZOFRAN) 4 mg tablet TAKE 1 TABLET BY MOUTH DAILY AS NEEDED FOR NAUSEA OR VOMITING   • pantoprazole (PROTONIX) 40 mg tablet Take 40 mg by mouth daily     • tamsulosin (FLOMAX) 0.4 mg Take 1 capsule by mouth every evening   • Aspirin Low Dose 81 MG chewable tablet Chew 81 mg daily (Patient not taking: Reported on 9/8/2023)   • celecoxib (CeleBREX) 200 mg capsule Take 1 capsule (200 mg total) by mouth daily (Patient not taking: Reported on 4/23/2022)   • cyanocobalamin (VITAMIN B-12) 1000 MCG tablet Take 1 tablet by mouth daily (Patient not taking: Reported on 9/8/2023)   • Ergocalciferol (VITAMIN D2 PO) Take 50,000 Int'l Units by mouth once a week (Patient not taking: Reported on 4/23/2022)   • lactulose 20 g/30 mL Take by mouth daily as needed (Patient not taking: Reported on 4/23/2022)   • methocarbamol (ROBAXIN) 500 mg tablet Take 1 tablet (500 mg total) by mouth every 6 (six) hours as needed for muscle spasms (Patient not taking: Reported on 4/23/2022)   • pantoprazole (PROTONIX) 20 mg tablet Take 20 mg by mouth daily (Patient not taking: Reported on 9/8/2023)   • thiamine 100 MG tablet Take 1 tablet by mouth daily (Patient not taking: Reported on 9/8/2023)   • traMADol (ULTRAM) 50 mg tablet Take 50 mg by mouth (Patient not taking: Reported on 9/8/2023)     No current facility-administered medications on file prior to visit. Allergies   Allergen Reactions   • Other Other (See Comments)     Sore throat from too many raw oranges. Physical Exam    /66 (BP Location: Left arm, Patient Position: Sitting, Cuff Size: Adult)   Pulse 72   Temp (!) 96.1 °F (35.6 °C)   Ht 5' 5" (1.651 m)   Wt 58.7 kg (129 lb 6.4 oz)   SpO2 98%   BMI 21.53 kg/m²     Constitutional: normal, well developed, well nourished, alert, in no distress and non-toxic and no overt pain behavior. Eyes: anicteric  HEENT: grossly intact  Neck: supple, symmetric, trachea midline and no masses   Pulmonary:even and unlabored  Cardiovascular:No edema or pitting edema present  Skin:Normal without rashes or lesions and well hydrated  Psychiatric:Mood and affect appropriate  Neurologic:Cranial Nerves II-XII grossly intact Sensation grossly intact; no clonus negative montgomery's. Reflexes 2+ and brisk. Spurling's maneuver positive left sided. Musculoskeletal:normal gait. 5/5 strength bilaterally with AROM in all extremities. Normal heel toe and tip toe walking. Significant pain with cervical and lumbar facet loading bilaterally and with lateral spine rotation. ttp over cervical and lumbar paraspinal muscles. Negative sebastián's test, negative gaenslen's negative SIJ loading bilaterally. Imaging    Multilevel disc degeneration and cervical spondylosis, greatest at C4-5,   C5-6, C6-7.   2. At C4-5, disc ridge complex with uncovertebral arthropathy.  There is mild   impression of the ventral thecal sac with severe bilateral neural foraminal   stenosis. 3. At C5-6, disc ridge complex with uncovertebral arthropathy. There is mild   impression of the ventral thecal sac with severe bilateral neural foraminal   stenosis   4. At C6-7, central disc herniation. There is impression of the ventral thecal   sac without cord compression. There is mild bilateral neural foraminal   narrowing. 1. Multilevel disc degeneration and lumbar spondylosis   2. At L3-4, degenerative disc bulging with central disc herniation, ligamentum   flavum thickening facet arthropathy. Is moderate canal stenosis, bilateral   subarticular recess stenosis, moderate right, and severe left neural foraminal   narrowing. 3. At L4-5, disc bulge with central disc herniation, ligamentum flavum   thickening, facet arthropathy. There is mild canal stenosis with mild to   moderate right and mild to moderate left neural foraminal stenosis. 4. At L5-S1, disc bulge with right paracentral disc herniation. There is mild   impression of the ventral thecal sac, moderate right subarticular recess   stenosis, moderate bilateral neural foraminal stenosis.

## 2023-09-08 NOTE — H&P (VIEW-ONLY)
Assessment  1. Cervical radiculopathy - Left  -     gabapentin (NEURONTIN) 300 mg capsule; Take 1 capsule (300 mg total) by mouth 3 (three) times a day  -     Ambulatory referral to Physical Therapy; Future  -     Case request operating room: BLOCK / INJECTION EPIDURAL STEROID CERVICAL C7-T1 or alternate level; Standing  -     Case request operating room: BLOCK / INJECTION EPIDURAL STEROID CERVICAL C7-T1 or alternate level    2. Cervical spondylosis - Bilateral  -     gabapentin (NEURONTIN) 300 mg capsule; Take 1 capsule (300 mg total) by mouth 3 (three) times a day  -     Ambulatory referral to Physical Therapy; Future  -     Case request operating room: BLOCK / INJECTION EPIDURAL STEROID CERVICAL C7-T1 or alternate level; Standing  -     Case request operating room: BLOCK / INJECTION EPIDURAL STEROID CERVICAL C7-T1 or alternate level    3. Lumbar spondylosis  -     Ambulatory referral to Physical Therapy; Future    Left sided cervical radicular pain in C6 dermatomal distribution accompanied by pain limited weakness numbness and paresthesias. Patient has not been a full participant with PT. Chronic pain with decreased participation with IADLs over the past few years. Has been taking NSAIDs and tramadol infrequently with modest benefit. 5/5 strength bilaterally in upper extremities with AROM, positive spurling's maneuver, left sided. Additionally there is positive cervical facet loading eliciting pain, left greater than right. Negative Mccall's, denies any gait instability, saddle anesthesia. On MRI imaging Scattered multilevel spondylotic changes with mild degree of central canal stenosis with severe transforaminal stenosis C4-C7. No cord compression or cord signal abnormality. Risks, benefits alternatives to epidural steroid injections thoroughly discussed with patient. Handouts provided questions answered to patient's satisfaction.   Lifestyle modifications extensively discussed including sleep and neck posture, in conjunction with PT. Will proceed with multimodal pain therapy plan as noted below:    Axial low back pain described primarily by arthritic features. Aching, nagging, indolent, stabbing, throbbing features in axial low back without radicular components. 5/5 strength bilaterally, negative SLR. Positive facet loading maneuvers in lumbar spine elicited pain, positive tenderness to palpation over lumbar paraspinal muscles. Findings correlate with prominent lumbar facet arthropathy seen throughout axial low back on x-ray. Currently he is neurologically intact without gait instability, saddle anesthesia or bowel/bladder abnormality. Risks, benefits alternative to medial branch blocks and subsequent radiofrequency ablation of successful thoroughly discussed with patient. Handouts provided questions answered to patient satisfaction. Plan  -C7-T1 LESI; f/u 2 weeks post procedure  -gabapentin 300 mg t.i.d. Ordered for patient; counseled regarding sedative effects of taking this medication and provided up titration calendar. Counseled not to take medication while driving or operating heavy machinery/using stairs  -script provided for formal physical therapy for right-sided lumbar radiculopathy; Physician directed home exercise plan as per [employer] demonstrated and handouts provided that patient plans to participate with for 1 hour, twice a week for the next 6 weeks. There are risks associated with opioid medications, including dependence, addiction and tolerance. The patient understands and agrees to use these medications only as prescribed. Potential side effects of the medications include, but are not limited to, constipation, drowsiness, addiction, impaired judgment and risk of fatal overdose if not taken as prescribed. The patient was warned against driving while taking sedation medications or operating heavy machinery. The patient voiced understanding. Sharing medications is a felony.  At this point in time, the patient is showing no signs of addiction, abuse, diversion or suicidal ideation. [place] Prescription Drug Monitoring Program report was reviewed and was appropriate      Complete risks and benefits including bleeding, infection, tissue reaction, nerve injury and allergic reaction were discussed. The approach was demonstrated using models and literature was provided. Verbal and written consent was obtained. My impressions and treatment recommendations were discussed in detail with the patient who verbalized understanding and had no further questions. Discharge instructions were provided. I personally saw and examined the patient and I agree with the above discussed plan of care.     New Medications Ordered This Visit   Medications   • multivitamin (THERAGRAN) TABS     Sig: Take 1 tablet by mouth daily   • atorvastatin (LIPITOR) 40 mg tablet     Sig: Take 40 mg by mouth daily   • cyanocobalamin (VITAMIN B-12) 1000 MCG tablet     Sig: Take 1 tablet by mouth daily   • folic acid (FOLVITE) 1 mg tablet     Sig: Take 1 tablet by mouth daily   • metoprolol tartrate (LOPRESSOR) 25 mg tablet     Sig: TAKE 1/2 OF A TABLET (12.5 MG TOTAL) BY MOUTH TWICE A DAY   • naltrexone (REVIA) 50 mg tablet     Sig: Take 50 mg by mouth daily   • ondansetron (ZOFRAN) 4 mg tablet     Sig: TAKE 1 TABLET BY MOUTH DAILY AS NEEDED FOR NAUSEA OR VOMITING   • tamsulosin (FLOMAX) 0.4 mg     Sig: Take 1 capsule by mouth every evening   • thiamine 100 MG tablet     Sig: Take 1 tablet by mouth daily   • traMADol (ULTRAM) 50 mg tablet     Sig: Take 50 mg by mouth   • Aspirin Low Dose 81 MG chewable tablet     Sig: Chew 81 mg daily   • cyanocobalamin (VITAMIN B-12) 100 mcg tablet     Sig: Take 100 mcg by mouth daily   • pantoprazole (PROTONIX) 20 mg tablet     Sig: Take 20 mg by mouth daily   • gabapentin (NEURONTIN) 300 mg capsule     Sig: Take 1 capsule (300 mg total) by mouth 3 (three) times a day     Dispense:  90 capsule     Refill:  2       History of Present Illness    Justin Layne is a 81 y.o. male with pmxh of HTN, Lorelie Gazella, GERD presenting with left-sided neck pain described primarily as radicular nature. The pain radiates in the C6 dermatomal distributions and is primarily left-sided. The pain contributes to significant disability in participation with independent activities of daily living and is accompanied by weakness numbness and paresthesias that are debilitating in nature. The patient describes that overhead maneuvers such as combing hair is significantly limiting with respect to strength in the left arm/hand. The patient notes significant weakness with left hand  as well at times. The patient has not been to physical therapy. He has trialed conservative measures including naproxen and tylenol for the pain but has not trialed any steroids. He has never had interventional pain procedures in the past including any cervical interlaminar epidural steroid injections in the past for this pain. Denies any gait abnormality, saddle anesthesia or bowel/bladder abnormality. Additionally achy, nagging, indolent, crampy, stabbing pain in his axial low back. The patient describes that the pain is worse with standing for long periods of time on hard surfaces as well as with walking. The patient is a very active individual and feels as though this pain compromises his participation with independent activities of daily living. The pain can be debilitating at times and contribute to significant disability, compromising overall activity and independent activities of daily living. He has not been to PT  Medications the patient has tried in the past include nsaids, tylenol. He describes no radicular symptoms and has good strength. He denies any weakness numbness or paresthesias. The patient denies any bowel or bladder dysfunction, saddle anesthesia or gait instability as well.       I have personally reviewed and/or updated the patient's past medical history, past surgical history, family history, social history, current medications, allergies, and vital signs today. Review of Systems   Constitutional: Positive for activity change. HENT: Negative. Eyes: Negative. Respiratory: Negative. Cardiovascular: Negative. Gastrointestinal: Negative. Endocrine: Negative. Genitourinary: Negative. Musculoskeletal: Positive for arthralgias, back pain, myalgias, neck pain and neck stiffness. Negative for gait problem. Skin: Negative. Allergic/Immunologic: Negative. Neurological: Positive for weakness and numbness. Hematological: Negative. Psychiatric/Behavioral: Negative. All other systems reviewed and are negative. Patient Active Problem List   Diagnosis   • Cervical spondylosis - Bilateral   • Cervical radiculopathy - Left   • Lumbar spondylosis   • Lumbar radiculopathy       Past Medical History:   Diagnosis Date   • Fatty liver    • Food allergy     strawberries- tickling in throat   • GERD (gastroesophageal reflux disease)    • OA (osteoarthritis)     left knee   • Seasonal allergies    • Wears glasses        Past Surgical History:   Procedure Laterality Date   • CATARACT EXTRACTION Bilateral    • ESOPHAGOGASTRODUODENOSCOPY     • KNEE ARTHROSCOPY Left    • LIVER BIOPSY     • TX ARTHRP KNE CONDYLE&PLATU MEDIAL&LAT COMPARTMENTS Left 2018    Procedure: ARTHROPLASTY KNEE TOTAL;  Surgeon: Mame Rivera MD;  Location: [facility];  Service: Orthopedics   • US GUIDED LIVER BIOPSY  2022       History reviewed. No pertinent family history. Social History     Occupational History   • Not on file   Tobacco Use   • Smoking status: Former     Types: Cigarettes     Quit date: 7/10/1976     Years since quittin.1   • Smokeless tobacco: Never   Vaping Use   • Vaping Use: Never used   Substance and Sexual Activity   • Alcohol use:  Yes     Alcohol/week: 3.0 standard drinks of alcohol     Types: 3 Cans of beer per week     Comment: 2-3 drinks day beer   • Drug use: No   • Sexual activity: Yes       Current Outpatient Medications on File Prior to Visit   Medication Sig   • aspirin (ECOTRIN LOW STRENGTH) 81 mg EC tablet Take 1 tablet (81 mg total) by mouth 2 (two) times a day   • atorvastatin (LIPITOR) 40 mg tablet Take 40 mg by mouth daily   • cyanocobalamin (VITAMIN B-12) 100 mcg tablet Take 100 mcg by mouth daily   • fluticasone (FLONASE) 50 mcg/act nasal spray 2 sprays into each nostril daily as needed   • folic acid (FOLVITE) 1 mg tablet Take 1 tablet by mouth daily   • loratadine (CLARITIN) 10 mg tablet Take 10 mg by mouth daily as needed     • metoprolol tartrate (LOPRESSOR) 25 mg tablet TAKE 1/2 OF A TABLET (12.5 MG TOTAL) BY MOUTH TWICE A DAY   • multivitamin (THERAGRAN) TABS Take 1 tablet by mouth daily   • naltrexone (REVIA) 50 mg tablet Take 50 mg by mouth daily   • naproxen (NAPROSYN) 500 mg tablet Take 1 tablet (500 mg total) by mouth 2 (two) times a day as needed for mild pain or moderate pain   • ondansetron (ZOFRAN) 4 mg tablet TAKE 1 TABLET BY MOUTH DAILY AS NEEDED FOR NAUSEA OR VOMITING   • pantoprazole (PROTONIX) 40 mg tablet Take 40 mg by mouth daily     • tamsulosin (FLOMAX) 0.4 mg Take 1 capsule by mouth every evening   • Aspirin Low Dose 81 MG chewable tablet Chew 81 mg daily (Patient not taking: Reported on 9/8/2023)   • celecoxib (CeleBREX) 200 mg capsule Take 1 capsule (200 mg total) by mouth daily (Patient not taking: Reported on 4/23/2022)   • cyanocobalamin (VITAMIN B-12) 1000 MCG tablet Take 1 tablet by mouth daily (Patient not taking: Reported on 9/8/2023)   • Ergocalciferol (VITAMIN D2 PO) Take 50,000 Int'l Units by mouth once a week (Patient not taking: Reported on 4/23/2022)   • lactulose 20 g/30 mL Take by mouth daily as needed (Patient not taking: Reported on 4/23/2022)   • methocarbamol (ROBAXIN) 500 mg tablet Take 1 tablet (500 mg total) by mouth every 6 (six) hours as needed for muscle spasms (Patient not taking: Reported on 4/23/2022)   • pantoprazole (PROTONIX) 20 mg tablet Take 20 mg by mouth daily (Patient not taking: Reported on 9/8/2023)   • thiamine 100 MG tablet Take 1 tablet by mouth daily (Patient not taking: Reported on 9/8/2023)   • traMADol (ULTRAM) 50 mg tablet Take 50 mg by mouth (Patient not taking: Reported on 9/8/2023)     No current facility-administered medications on file prior to visit. Allergies   Allergen Reactions   • Other Other (See Comments)     Sore throat from too many raw oranges. Physical Exam    /66 (BP Location: Left arm, Patient Position: Sitting, Cuff Size: Adult)   Pulse 72   Temp (!) 96.1 °F (35.6 °C)   Ht 5' 5" (1.651 m)   Wt 58.7 kg (129 lb 6.4 oz)   SpO2 98%   BMI 21.53 kg/m²     Constitutional: normal, well developed, well nourished, alert, in no distress and non-toxic and no overt pain behavior. Eyes: anicteric  HEENT: grossly intact  Neck: supple, symmetric, trachea midline and no masses   Pulmonary:even and unlabored  Cardiovascular:No edema or pitting edema present  Skin:Normal without rashes or lesions and well hydrated  Psychiatric:Mood and affect appropriate  Neurologic:Cranial Nerves II-XII grossly intact Sensation grossly intact; no clonus negative montgomery's. Reflexes 2+ and brisk. Spurling's maneuver positive left sided. Musculoskeletal:normal gait. 5/5 strength bilaterally with AROM in all extremities. Normal heel toe and tip toe walking. Significant pain with cervical and lumbar facet loading bilaterally and with lateral spine rotation. ttp over cervical and lumbar paraspinal muscles. Negative sebastián's test, negative gaenslen's negative SIJ loading bilaterally. Imaging    Multilevel disc degeneration and cervical spondylosis, greatest at C4-5,   C5-6, C6-7.   2. At C4-5, disc ridge complex with uncovertebral arthropathy.  There is mild   impression of the ventral thecal sac with severe bilateral neural foraminal   stenosis. 3. At C5-6, disc ridge complex with uncovertebral arthropathy. There is mild   impression of the ventral thecal sac with severe bilateral neural foraminal   stenosis   4. At C6-7, central disc herniation. There is impression of the ventral thecal   sac without cord compression. There is mild bilateral neural foraminal   narrowing. 1. Multilevel disc degeneration and lumbar spondylosis   2. At L3-4, degenerative disc bulging with central disc herniation, ligamentum   flavum thickening facet arthropathy. Is moderate canal stenosis, bilateral   subarticular recess stenosis, moderate right, and severe left neural foraminal   narrowing. 3. At L4-5, disc bulge with central disc herniation, ligamentum flavum   thickening, facet arthropathy. There is mild canal stenosis with mild to   moderate right and mild to moderate left neural foraminal stenosis. 4. At L5-S1, disc bulge with right paracentral disc herniation. There is mild   impression of the ventral thecal sac, moderate right subarticular recess   stenosis, moderate bilateral neural foraminal stenosis.

## 2023-09-08 NOTE — PATIENT INSTRUCTIONS
Neck Exercises   AMBULATORY CARE:   Neck exercises  help reduce neck pain, and improve neck movement and strength. Neck exercises also help prevent long-term neck problems. Call your doctor if:   Your pain does not get better, or gets worse. You have questions or concerns about your condition, care, or exercise program.    What you need to know about exercise safety:   Move slowly, gently, and smoothly. Avoid fast or jerky motions. Stand and sit the way your healthcare provider shows you. Good posture may reduce your neck pain. Check your posture often, even when you are not doing your neck exercises. Follow the exercise program recommended by your healthcare provider. He or she will tell you which exercises are best for your condition. He or she will also tell you how many repetitions to do and how often you should do the exercises. How to perform neck exercises safely:   Exercise position:  You may sit or stand while you do neck exercises. Face forward. Your shoulders should be straight and relaxed, with a good posture. Head tilts, forward and back:  Gently bow your head and try to touch your chin to your chest. Your healthcare provider may tell you to push on the back of your neck to help bow your head. Raise your chin back to the starting position. Tilt your head back as far as possible so you are looking up at the ceiling. Your healthcare provider may tell you to lift your chin to help tilt your head back. Return your head to the starting position. Head tilts, side to side:  Tilt your head, bringing your ear toward your shoulder. Then tilt your head toward the other shoulder. Head turns:  Turn your head to look over your shoulder. Tilt your chin down and try to touch it to your shoulder. Do not raise your shoulder to your chin. Face forward again. Do the same on the other side.          Head rolls:  Slowly bring your chin toward your chest. Next, roll your head to the right. Your ear should be positioned over your shoulder. Hold this position for 5 seconds. Roll your head back toward your chest and to the left into the same position. Hold for 5 seconds. Gently roll your head back and around in a clockwise [place] 3 times. Next, move your head in the reverse direction (counterclockwise) in a Kash[place] 3 times. Do not shrug your shoulders upwards while you do this exercise. Follow up with your doctor as directed:  Write down your questions so you remember to ask them during your visits. © Copyright Santino Qiu 2022 Information is for End User's use only and may not be sold, redistributed or otherwise used for commercial purposes. The above information is an  only. It is not intended as medical advice for individual conditions or treatments. Talk to your doctor, nurse or pharmacist before following any medical regimen to see if it is safe and effective for you. Core Strengthening Exercises   WHAT YOU NEED TO KNOW:   Your core includes the muscles of your lower back, hip, pelvis, and abdomen. Core strengthening exercises help heal and strengthen these muscles. This helps prevent another injury, and keeps your pelvis, spine, and hips in the correct position. DISCHARGE INSTRUCTIONS:   Call your doctor or physical therapist if:   You have sharp or worsening pain during exercise or at rest.    You have questions or concerns about your shoulder exercises. Safety tips:  Talk to your healthcare provider before you start an exercise program. A physical therapist can teach you how to do core strengthening exercises safely. Do the exercises on a mat or firm surface. A firm surface will support your spine and prevent low back pain. Do not do these exercises on a bed. Move slowly and smoothly. Avoid fast or jerky motions. Stop if you feel pain. You may feel some discomfort at first, but you should not feel pain.  Tell your provider or physical therapist if you have pain while you exercise. Regular exercise will help decrease your discomfort over time. Breathe normally during core exercises. Do not hold your breath. This may cause an increase in blood pressure and prevent muscle strengthening. Your healthcare provider will tell you when to inhale and exhale during the exercise. Begin all of your exercises with abdominal bracing. Abdominal bracing helps warm up your core muscles. You can also practice abdominal bracing throughout the day. Lie on your back with your knees bent and feet flat on the floor. Place your arms in a relaxed position beside your body. Tighten your abdominal muscles. Pull your belly button in and up toward your spine. Hold for 5 seconds. Relax your muscles. Repeat 10 times. Core strengthening exercises: Your healthcare provider will tell you how often to do these exercises. The provider will also tell you how many repetitions of each exercise you should do. Hold each exercise for 5 seconds or as directed. As you get stronger, increase your hold to 10 to 15 seconds. You can do some of these exercises on a stability ball, or with a weight. Ask your healthcare provider how to use a stability ball or weight for these exercises:  Bridging:  Lie on your back with your knees bent and feet flat on the floor. Rest your arms at your side. Tighten your buttocks, and then lift your hips 1 inch off the floor. Hold for 5 seconds. When you can do this exercise without pain for 10 seconds, increase the distance you lift your hips. A good goal is to be able to lift your hips so that your shoulders, hips, and knees are in a straight line. Dead bug:  Lie on your back with your knees bent and feet flat on the floor. Place your arms in a relaxed position beside your body. Begin with abdominal bracing. Next, raise one leg, keeping your knee bent. Hold for 5 seconds. Repeat with the other leg.  When you can do this exercise without pain for 10 to 15 seconds, you may raise one straight leg and hold. Repeat with the other leg. Quadruped:  Place your hands and knees on the floor. Keep your wrists directly below your shoulders and your knees directly below your hips. Pull your belly button in toward your spine. Do not flatten or arch your back. Tighten your abdominal muscles below your belly button. Hold for 5 seconds. When you can do this exercise without pain for 10 to 15 seconds, you may extend one arm and hold. Repeat on the other side. Side bridge exercises:      Standing side bridge:  Stand next to a wall and extend one arm toward the wall. Place your palm flat on the wall with your fingers pointing upward. Begin with abdominal bracing. Next, without moving your feet, slowly bend your arm to 90 degrees. Hold for 5 seconds. Repeat on the other side. When you can do this exercise without pain for 10 to 15 seconds, you may do the bent leg side bridge on the floor. Bent leg side bridge:  Lie on one side with your legs, hips, and shoulders in a straight line. Prop yourself up onto your forearm so your elbow is directly below your shoulder. Bend your knees back to 90 degrees. Begin with abdominal bracing. Next, lift your hips and balance yourself on your forearm and knees. Hold for 5 seconds. Repeat on the other side. When you can do this exercise without pain for 10 to 15 seconds, you may do the straight leg side bridge on the floor. Straight leg side bridge:  Lie on one side with your legs, hips, and shoulders in a straight line. Prop yourself up onto your forearm so your elbow is directly below your shoulder. Begin with abdominal bracing. Lift your hips off the floor and balance yourself on your forearm and the outside of your flexed foot. Do not let your ankle bend sideways. Hold for 5 seconds. Repeat on the other side.  When you can do this exercise without pain for 10 to 15 seconds, ask your healthcare provider for more advanced exercises. Superman:  Lie on your stomach. Extend your arms forward on the floor. Tighten your abdominal muscles and lift your right hand and left leg off the floor. Hold this position. Slowly return to the starting position. Tighten your abdominal muscles and lift your left hand and right leg off the floor. Hold this position. Slowly return to the starting position. Clam:  Lie on your side with your knees bent. Put your bottom arm under your head to keep your neck in line. Put your top hand on your hip to keep your pelvis from moving. Put your heels together, and keep them together during this exercise. Slowly raise your top knee toward the ceiling. Then lower your leg so your knees are together. Repeat this exercise 10 times. Then switch sides and do the exercise 10 times with the other leg. Curl up:  Lie on your back with your knees bent and feet flat on the floor. Place your hands, palms down, underneath your lower back. Next, with your elbows on the floor, lift your shoulders and chest 2 to 3 inches off the floor. Keep your head in line with your shoulders. Hold this position. Slowly return to the starting position. Straight leg raises:  Lie on your back with one leg straight. Bend the other knee and place your foot flat on the floor. Tighten your abdominal muscles. Keep your leg straight and slowly lift it straight up 6 to 12 inches off the floor. Hold this position. Lower your leg slowly. Do as many repetitions as directed on this side. Repeat with the other leg. Plank:  Lie on your stomach. Bend your elbows and place your forearms flat on the floor. Lift your chest, stomach, and knees off the floor. Make sure your elbows are below your shoulders. Your body should be in a straight line. Do not let your hips or lower back sink to the ground. Squeeze your abdominal muscles together and hold for 15 seconds.  To make this exercise harder, hold for 30 seconds or lift 1 leg at a time.         Bicycles:  Lie on your back. Bend both knees and bring them toward your chest. Your calves should be parallel to the floor. Place the palms of your hands on the back of your head. Straighten your right leg and keep it lifted 2 inches off the floor. Raise your head and shoulders off the floor and twist towards your left. Keep your head and shoulders lifted. Bend your right knee while you straighten your left leg. Keep your left leg 2 inches off the floor. Twist your head and chest towards the left leg. Continue to straighten 1 leg at a time and twist.       Follow up with your doctor or physical therapist as directed:  Write down your questions so you remember to ask them during your visits. © Copyright Joana Sin 2022 Information is for End User's use only and may not be sold, redistributed or otherwise used for commercial purposes. The above information is an  only. It is not intended as medical advice for individual conditions or treatments. Talk to your doctor, nurse or pharmacist before following any medical regimen to see if it is safe and effective for you.

## 2023-09-13 NOTE — DISCHARGE INSTR - AVS FIRST PAGE
YOUR 2 WEEK FOLLOW UP HAS BEEN SCHEDULED; IF YOU WISH TO CHANGE THE FOLLOW UP, PLEASE CALL THE [facility] AT [place]: [phone]    Epidural Steroid Injection   WHAT YOU NEED TO KNOW:   An epidural steroid injection (JEAN CLAUDE) is a procedure to inject steroid medicine into the epidural space. The epidural space is between your spinal cord and vertebrae. Steroids reduce inflammation and fluid buildup in your spine that may be causing pain. You may be given pain medicine along with the steroids. DISCHARGE INSTRUCTIONS:   Call your local emergency number (911 in the [address]) if:   You have a seizure. You have trouble moving your legs. Seek care immediately if:   Blood soaks through your bandage. You have a fever or chills, severe back pain, and the procedure area is sensitive to the touch. You cannot control when you urinate or have a bowel movement. Call your doctor if:   You have weakness or numbness in your legs. Your wound is red, swollen, or draining pus. You have nausea or are vomiting. Your face or neck is red and you feel warm. You have more pain than you had before the procedure. You have swelling in your hands or feet. You have questions or concerns about your condition or care. Care for your wound as directed: You may remove the bandage before you go to bed the day of your procedure. You may take a shower, but do not take a bath for at least 24 hours. Self-care:   Do not drive,  use machines, or do strenuous activity for 24 hours after your procedure or as directed. Continue other treatments  as directed. Steroid injections alone will not control your pain. The injections are meant to be used with other treatments, such as physical therapy. Follow up with your doctor as directed:  Write down your questions so you remember to ask them during your visits.    © Copyright Burtis Clarita  Information is for End User's use only and may not be sold, redistributed or otherwise used for commercial purposes. The above information is an  only. It is not intended as medical advice for individual conditions or treatments. Talk to your doctor, nurse or pharmacist before following any medical regimen to see if it is safe and effective for you.

## 2023-09-14 ENCOUNTER — HOSPITAL ENCOUNTER (OUTPATIENT)
Facility: HOSPITAL | Age: 73
Setting detail: OUTPATIENT SURGERY
Discharge: HOME | End: 2023-09-14
Attending: ANESTHESIOLOGY | Admitting: ANESTHESIOLOGY
Payer: MEDICARE

## 2023-09-14 ENCOUNTER — APPOINTMENT (OUTPATIENT)
Dept: RADIOLOGY | Facility: HOSPITAL | Age: 73
End: 2023-09-14
Payer: MEDICARE

## 2023-09-14 VITALS
HEIGHT: 65 IN | BODY MASS INDEX: 21.49 KG/M2 | TEMPERATURE: 98 F | HEART RATE: 62 BPM | RESPIRATION RATE: 20 BRPM | WEIGHT: 129 LBS | SYSTOLIC BLOOD PRESSURE: 118 MMHG | OXYGEN SATURATION: 99 % | DIASTOLIC BLOOD PRESSURE: 70 MMHG

## 2023-09-14 PROCEDURE — 62321 NJX INTERLAMINAR CRV/THRC: CPT | Performed by: ANESTHESIOLOGY

## 2023-09-14 RX ORDER — LIDOCAINE HYDROCHLORIDE 10 MG/ML
INJECTION, SOLUTION EPIDURAL; INFILTRATION; INTRACAUDAL; PERINEURAL AS NEEDED
Status: DISCONTINUED | OUTPATIENT
Start: 2023-09-14 | End: 2023-09-14 | Stop reason: HOSPADM

## 2023-09-14 RX ORDER — METHYLPREDNISOLONE ACETATE 80 MG/ML
INJECTION, SUSPENSION INTRA-ARTICULAR; INTRALESIONAL; INTRAMUSCULAR; SOFT TISSUE AS NEEDED
Status: DISCONTINUED | OUTPATIENT
Start: 2023-09-14 | End: 2023-09-14 | Stop reason: HOSPADM

## 2023-09-14 RX ORDER — SODIUM CHLORIDE 9 MG/ML
INJECTION INTRAVENOUS AS NEEDED
Status: DISCONTINUED | OUTPATIENT
Start: 2023-09-14 | End: 2023-09-14 | Stop reason: HOSPADM

## 2023-09-14 NOTE — PROCEDURES
Pre-procedure Diagnosis: Cervical Radiculopathy  Post-procedure Diagnosis: Cervical Radiculopathy  Procedure Title(s):  1. C7-T1 interlaminar epidural steroid injection      2. Intraoperative fluoroscopy  Attending Surgeon:   Ida Snyder MD  Anesthesia:   Local     Indications: The patient is a 81y.o. year-old male with a diagnosis of Cervical Radiculopathy. The patient's history and physical exam were reviewed. The risks, benefits and alternatives to the procedure were discussed, and all questions were answered to the patient's satisfaction. The patient agreed to proceed, and written informed consent was obtained. Procedure in Detail: The patient was brought into the procedure room and placed in the prone position on the fluoroscopy table. The area of the cervical spine was prepped with chlorhexidine gluconate solution times one and draped in a sterile manner. The C7-T1 interspace was identified and marked under AP fluoroscopy. The skin and subcutaneous tissues in the area were anesthetized with 1% lidocaine. A 18-gauge Tuohy epidural needle was directed toward the interspace under fluoroscopic guidance until the ligamentum flavum was engaged. The C-arm was oblique to the right to obtain a contra-lateral oblique view. From this point, a loss of resistance technique with saline was used to identify entrance of the needle into the epidural space. Once an appropriate loss was obtained, negative aspiration was confirmed, and 1 ml Omnipaque 240 contrast solution was injected. An appropriate epidurogram was noted. Then, after negative aspiration, a solution consisting of 1-mL depo-medrol (80mg/mL) and 3-mL preservative-free saline was easily injected. The needle was removed with a 1% lidocaine flush. The patient's back was cleaned and a bandage was placed over the site of needle insertion. Disposition: The patient tolerated the procedure well, and there were no apparent complications.  The patient was taken to the recovery area where written discharge instructions for the procedure were given.      Estimated Blood Loss: None  Specimens Obtained: N/A

## 2023-09-14 NOTE — OP NOTE
OPERATIVE REPORT  PATIENT NAME: Fidencio Oneal    :  1950  MRN: [id number]  Pt Location:  GI ROOM 01    SURGERY DATE: 2023    Surgeon(s) and Role: John Ulrich MD - Primary    Preop Diagnosis:  Cervical radiculopathy [M54.12]  Cervical spondylosis [M47.812]    Post-Op Diagnosis Codes:     * Cervical radiculopathy [M54.12]     * Cervical spondylosis [M47.812]    Procedure(s):  BLOCK / INJECTION EPIDURAL STEROID CERVICAL C7-T1    Specimen(s):  * No specimens in log *    Estimated Blood Loss:   Minimal    Anesthesia Type:   Local    Operative Indications:  Cervical radiculopathy [M54.12]  Cervical spondylosis [M47.812]    Operative Findings:  C7-T1 epidurogram    Complications:   None    Procedure and Technique:  Please see detailed procedure note    I was present for the entire procedure.     Patient Disposition:  PACU     SIGNATURE: John Ulrich MD  DATE: 2023  TIME: 11:01 AM

## 2023-09-14 NOTE — INTERVAL H&P NOTE
H&P reviewed. After examining the patient I find no changes in the patients condition since the H&P had been written.     Vitals:    09/14/23 1013   BP: 113/65   Pulse: 66   Resp: 20   Temp: 98 °F (36.7 °C)   SpO2: 99%

## 2023-09-21 ENCOUNTER — TELEPHONE (OUTPATIENT)
Dept: PAIN MEDICINE | Facility: CLINIC | Age: 73
End: 2023-09-21

## 2023-10-06 ENCOUNTER — OFFICE VISIT (OUTPATIENT)
Dept: PAIN MEDICINE | Facility: CLINIC | Age: 73
End: 2023-10-06
Payer: MEDICARE

## 2023-10-06 ENCOUNTER — HOSPITAL ENCOUNTER (OUTPATIENT)
Dept: RADIOLOGY | Facility: CLINIC | Age: 73
End: 2023-10-06
Payer: MEDICARE

## 2023-10-06 VITALS
TEMPERATURE: 97.4 F | WEIGHT: 130 LBS | SYSTOLIC BLOOD PRESSURE: 122 MMHG | HEIGHT: 65 IN | HEART RATE: 64 BPM | OXYGEN SATURATION: 99 % | DIASTOLIC BLOOD PRESSURE: 76 MMHG | BODY MASS INDEX: 21.66 KG/M2

## 2023-10-06 DIAGNOSIS — M25.562 ACUTE PAIN OF LEFT KNEE: ICD-10-CM

## 2023-10-06 DIAGNOSIS — M65.332 TRIGGER FINGER, LEFT MIDDLE FINGER: ICD-10-CM

## 2023-10-06 DIAGNOSIS — M47.816 LUMBAR SPONDYLOSIS: ICD-10-CM

## 2023-10-06 DIAGNOSIS — M47.812 CERVICAL SPONDYLOSIS: ICD-10-CM

## 2023-10-06 DIAGNOSIS — M54.12 CERVICAL RADICULOPATHY: Primary | ICD-10-CM

## 2023-10-06 PROCEDURE — 73562 X-RAY EXAM OF KNEE 3: CPT

## 2023-10-06 PROCEDURE — 99214 OFFICE O/P EST MOD 30 MIN: CPT | Performed by: ANESTHESIOLOGY

## 2023-10-06 RX ORDER — GABAPENTIN 600 MG/1
600 TABLET ORAL 3 TIMES DAILY
Qty: 90 TABLET | Refills: 2 | Status: SHIPPED | OUTPATIENT
Start: 2023-10-06

## 2023-10-06 RX ORDER — 0.9 % SODIUM CHLORIDE 0.9 %
3 VIAL (ML) INJECTION ONCE
OUTPATIENT
Start: 2023-10-06 | End: 2023-10-06

## 2023-10-06 RX ORDER — LIDOCAINE HYDROCHLORIDE 10 MG/ML
5 INJECTION, SOLUTION EPIDURAL; INFILTRATION; INTRACAUDAL; PERINEURAL ONCE
OUTPATIENT
Start: 2023-10-06 | End: 2023-10-06

## 2023-10-06 RX ORDER — METHYLPREDNISOLONE ACETATE 80 MG/ML
80 INJECTION, SUSPENSION INTRA-ARTICULAR; INTRALESIONAL; INTRAMUSCULAR; PARENTERAL; SOFT TISSUE ONCE
OUTPATIENT
Start: 2023-10-06 | End: 2023-10-06

## 2023-10-06 NOTE — PATIENT INSTRUCTIONS
Core Strengthening Exercises   WHAT YOU NEED TO KNOW:   Your core includes the muscles of your lower back, hip, pelvis, and abdomen. Core strengthening exercises help heal and strengthen these muscles. This helps prevent another injury, and keeps your pelvis, spine, and hips in the correct position. DISCHARGE INSTRUCTIONS:   Call your doctor or physical therapist if:   You have sharp or worsening pain during exercise or at rest.    You have questions or concerns about your shoulder exercises. Safety tips:  Talk to your healthcare provider before you start an exercise program. A physical therapist can teach you how to do core strengthening exercises safely. Do the exercises on a mat or firm surface. A firm surface will support your spine and prevent low back pain. Do not do these exercises on a bed. Move slowly and smoothly. Avoid fast or jerky motions. Stop if you feel pain. You may feel some discomfort at first, but you should not feel pain. Tell your provider or physical therapist if you have pain while you exercise. Regular exercise will help decrease your discomfort over time. Breathe normally during core exercises. Do not hold your breath. This may cause an increase in blood pressure and prevent muscle strengthening. Your healthcare provider will tell you when to inhale and exhale during the exercise. Begin all of your exercises with abdominal bracing. Abdominal bracing helps warm up your core muscles. You can also practice abdominal bracing throughout the day. Lie on your back with your knees bent and feet flat on the floor. Place your arms in a relaxed position beside your body. Tighten your abdominal muscles. Pull your belly button in and up toward your spine. Hold for 5 seconds. Relax your muscles. Repeat 10 times. Core strengthening exercises: Your healthcare provider will tell you how often to do these exercises.  The provider will also tell you how many repetitions of each exercise you should do. Hold each exercise for 5 seconds or as directed. As you get stronger, increase your hold to 10 to 15 seconds. You can do some of these exercises on a stability ball, or with a weight. Ask your healthcare provider how to use a stability ball or weight for these exercises:  Bridging:  Lie on your back with your knees bent and feet flat on the floor. Rest your arms at your side. Tighten your buttocks, and then lift your hips 1 inch off the floor. Hold for 5 seconds. When you can do this exercise without pain for 10 seconds, increase the distance you lift your hips. A good goal is to be able to lift your hips so that your shoulders, hips, and knees are in a straight line. Dead bug:  Lie on your back with your knees bent and feet flat on the floor. Place your arms in a relaxed position beside your body. Begin with abdominal bracing. Next, raise one leg, keeping your knee bent. Hold for 5 seconds. Repeat with the other leg. When you can do this exercise without pain for 10 to 15 seconds, you may raise one straight leg and hold. Repeat with the other leg. Quadruped:  Place your hands and knees on the floor. Keep your wrists directly below your shoulders and your knees directly below your hips. Pull your belly button in toward your spine. Do not flatten or arch your back. Tighten your abdominal muscles below your belly button. Hold for 5 seconds. When you can do this exercise without pain for 10 to 15 seconds, you may extend one arm and hold. Repeat on the other side. Side bridge exercises:      Standing side bridge:  Stand next to a wall and extend one arm toward the wall. Place your palm flat on the wall with your fingers pointing upward. Begin with abdominal bracing. Next, without moving your feet, slowly bend your arm to 90 degrees. Hold for 5 seconds. Repeat on the other side.  When you can do this exercise without pain for 10 to 15 seconds, you may do the bent leg side bridge on the floor. Bent leg side bridge:  Lie on one side with your legs, hips, and shoulders in a straight line. Prop yourself up onto your forearm so your elbow is directly below your shoulder. Bend your knees back to 90 degrees. Begin with abdominal bracing. Next, lift your hips and balance yourself on your forearm and knees. Hold for 5 seconds. Repeat on the other side. When you can do this exercise without pain for 10 to 15 seconds, you may do the straight leg side bridge on the floor. Straight leg side bridge:  Lie on one side with your legs, hips, and shoulders in a straight line. Prop yourself up onto your forearm so your elbow is directly below your shoulder. Begin with abdominal bracing. Lift your hips off the floor and balance yourself on your forearm and the outside of your flexed foot. Do not let your ankle bend sideways. Hold for 5 seconds. Repeat on the other side. When you can do this exercise without pain for 10 to 15 seconds, ask your healthcare provider for more advanced exercises. Superman:  Lie on your stomach. Extend your arms forward on the floor. Tighten your abdominal muscles and lift your right hand and left leg off the floor. Hold this position. Slowly return to the starting position. Tighten your abdominal muscles and lift your left hand and right leg off the floor. Hold this position. Slowly return to the starting position. Clam:  Lie on your side with your knees bent. Put your bottom arm under your head to keep your neck in line. Put your top hand on your hip to keep your pelvis from moving. Put your heels together, and keep them together during this exercise. Slowly raise your top knee toward the ceiling. Then lower your leg so your knees are together. Repeat this exercise 10 times. Then switch sides and do the exercise 10 times with the other leg. Curl up:  Lie on your back with your knees bent and feet flat on the floor.  Place your hands, palms down, underneath your lower back. Next, with your elbows on the floor, lift your shoulders and chest 2 to 3 inches off the floor. Keep your head in line with your shoulders. Hold this position. Slowly return to the starting position. Straight leg raises:  Lie on your back with one leg straight. Bend the other knee and place your foot flat on the floor. Tighten your abdominal muscles. Keep your leg straight and slowly lift it straight up 6 to 12 inches off the floor. Hold this position. Lower your leg slowly. Do as many repetitions as directed on this side. Repeat with the other leg. Plank:  Lie on your stomach. Bend your elbows and place your forearms flat on the floor. Lift your chest, stomach, and knees off the floor. Make sure your elbows are below your shoulders. Your body should be in a straight line. Do not let your hips or lower back sink to the ground. Squeeze your abdominal muscles together and hold for 15 seconds. To make this exercise harder, hold for 30 seconds or lift 1 leg at a time. Bicycles:  Lie on your back. Bend both knees and bring them toward your chest. Your calves should be parallel to the floor. Place the palms of your hands on the back of your head. Straighten your right leg and keep it lifted 2 inches off the floor. Raise your head and shoulders off the floor and twist towards your left. Keep your head and shoulders lifted. Bend your right knee while you straighten your left leg. Keep your left leg 2 inches off the floor. Twist your head and chest towards the left leg. Continue to straighten 1 leg at a time and twist.       Follow up with your doctor or physical therapist as directed:  Write down your questions so you remember to ask them during your visits. © Copyright [facility] 2023 Information is for End User's use only and may not be sold, redistributed or otherwise used for commercial purposes. The above information is an  only.  It is not intended as medical advice for individual conditions or treatments. Talk to your doctor, nurse or pharmacist before following any medical regimen to see if it is safe and effective for you.

## 2023-10-06 NOTE — PROGRESS NOTES
Assessment  1. Cervical radiculopathy - Left  -     gabapentin (Neurontin) 600 MG tablet; Take 1 tablet (600 mg total) by mouth 3 (three) times a day    2. Cervical spondylosis - Bilateral    3. Lumbar spondylosis  -     Case request operating room: BLOCK / INJECTION EPIDURAL STEROID LUMBAR L4-L5 or alternate level; Standing  -     Case request operating room: BLOCK / INJECTION EPIDURAL STEROID LUMBAR L4-L5 or alternate level    4. Acute pain of left knee  -     XR knee 3 vw left non injury; Future; Expected date: 10/06/2023    5. Trigger finger, left middle finger  -     Ambulatory referral to Orthopedic Surgery; Future    Greater than 90% relief of pain with improved ability to participate with IADLs after C7-T1 ILESI. Previously reported the following symptomatology:     Left sided cervical radicular pain in C6 dermatomal distribution accompanied by pain limited weakness numbness and paresthesias. Patient has not been a full participant with PT. Chronic pain with decreased participation with IADLs over the past few years. Has been taking NSAIDs and tramadol infrequently with modest benefit. 5/5 strength bilaterally in upper extremities with AROM, positive spurling's maneuver, left sided. Additionally there is positive cervical facet loading eliciting pain, left greater than right. Negative Mccall's, denies any gait instability, saddle anesthesia. On MRI imaging Scattered multilevel spondylotic changes with mild degree of central canal stenosis with severe transforaminal stenosis C4-C7. No cord compression or cord signal abnormality. Risks, benefits alternatives to epidural steroid injections thoroughly discussed with patient. Handouts provided questions answered to patient's satisfaction. Lifestyle modifications extensively discussed including sleep and neck posture, in conjunction with PT.   Will proceed with multimodal pain therapy plan as noted below:    Axial low back pain described primarily by arthritic features. Aching, nagging, indolent, stabbing, throbbing features in axial low back without radicular components. 5/5 strength bilaterally, negative SLR. Positive facet loading maneuvers in lumbar spine elicited pain, positive tenderness to palpation over lumbar paraspinal muscles. Findings correlate with prominent lumbar facet arthropathy seen throughout axial low back on x-ray. Currently he is neurologically intact without gait instability, saddle anesthesia or bowel/bladder abnormality. Risks, benefits alternative to medial branch blocks and subsequent radiofrequency ablation of successful thoroughly discussed with patient. Handouts provided questions answered to patient satisfaction. Plan  -L4-L5 ILESI; f/u 2 weeks post procedure  -trigger finger referral to orthopedics of left middle finger  -xray left knee; f/u result  -gabapentin increased to 600 mg t.i.d. Ordered for patient; counseled regarding sedative effects of taking this medication and provided up titration calendar. Counseled not to take medication while driving or operating heavy machinery/using stairs  -script provided for formal physical therapy for right-sided lumbar radiculopathy, cervical and lumbar spondylosis; Physician directed home exercise plan as per [employer] demonstrated and handouts provided that patient plans to participate with for 1 hour, twice a week for the next 6 weeks. There are risks associated with opioid medications, including dependence, addiction and tolerance. The patient understands and agrees to use these medications only as prescribed. Potential side effects of the medications include, but are not limited to, constipation, drowsiness, addiction, impaired judgment and risk of fatal overdose if not taken as prescribed. The patient was warned against driving while taking sedation medications or operating heavy machinery. The patient voiced understanding. Sharing medications is a felony.  At this point in time, the patient is showing no signs of addiction, abuse, diversion or suicidal ideation. [place] Prescription Drug Monitoring Program report was reviewed and was appropriate      Complete risks and benefits including bleeding, infection, tissue reaction, nerve injury and allergic reaction were discussed. The approach was demonstrated using models and literature was provided. Verbal and written consent was obtained. My impressions and treatment recommendations were discussed in detail with the patient who verbalized understanding and had no further questions. Discharge instructions were provided. I personally saw and examined the patient and I agree with the above discussed plan of care. No orders of the defined types were placed in this encounter. History of Present Illness    Greater than 90% relief of pain with improved ability to participate with IADLs after C7-T1 ILESI. Previously reported the following symptomatology:     Grace Kim is a 81 y.o. male with pmxh of HTN, Abraham Randa, GERD presenting with left-sided neck pain described primarily as radicular nature. The pain radiates in the C6 dermatomal distributions and is primarily left-sided. The pain contributes to significant disability in participation with independent activities of daily living and is accompanied by weakness numbness and paresthesias that are debilitating in nature. The patient describes that overhead maneuvers such as combing hair is significantly limiting with respect to strength in the left arm/hand. The patient notes significant weakness with left hand  as well at times. The patient has not been to physical therapy. He has trialed conservative measures including naproxen and tylenol for the pain but has not trialed any steroids. He has never had interventional pain procedures in the past including any cervical interlaminar epidural steroid injections in the past for this pain.  Denies any gait abnormality, saddle anesthesia or bowel/bladder abnormality. Additionally achy, nagging, indolent, crampy, stabbing pain in his axial low back. The patient describes that the pain is worse with standing for long periods of time on hard surfaces as well as with walking. The patient is a very active individual and feels as though this pain compromises his participation with independent activities of daily living. The pain can be debilitating at times and contribute to significant disability, compromising overall activity and independent activities of daily living. He has not been to PT  Medications the patient has tried in the past include nsaids, tylenol. He describes no radicular symptoms and has good strength. He denies any weakness numbness or paresthesias. The patient denies any bowel or bladder dysfunction, saddle anesthesia or gait instability as well. I have personally reviewed and/or updated the patient's past medical history, past surgical history, family history, social history, current medications, allergies, and vital signs today. Review of Systems   Constitutional: Positive for activity change. HENT: Negative. Eyes: Negative. Respiratory: Negative. Cardiovascular: Negative. Gastrointestinal: Negative. Endocrine: Negative. Genitourinary: Negative. Musculoskeletal: Positive for arthralgias, back pain, myalgias, neck pain and neck stiffness. Negative for gait problem. Skin: Negative. Allergic/Immunologic: Negative. Neurological: Positive for weakness and numbness. Hematological: Negative. Psychiatric/Behavioral: Negative. All other systems reviewed and are negative.       Patient Active Problem List   Diagnosis   • Cervical spondylosis - Bilateral   • Cervical radiculopathy - Left   • Lumbar spondylosis   • Lumbar radiculopathy       Past Medical History:   Diagnosis Date   • Fatty liver    • Food allergy     strawberries- tickling in throat   • GERD (gastroesophageal reflux disease)    • OA (osteoarthritis)     left knee   • Seasonal allergies    • Wears glasses        Past Surgical History:   Procedure Laterality Date   • CATARACT EXTRACTION Bilateral    • EPIDURAL BLOCK INJECTION N/A 2023    Procedure: BLOCK / INJECTION EPIDURAL STEROID CERVICAL C7-T1;  Surgeon: Elmer Krishnamurthy MD;  Location:  [facility];  Service: Pain Management    • ESOPHAGOGASTRODUODENOSCOPY     • KNEE ARTHROSCOPY Left    • LIVER BIOPSY     • CT ARTHRP KNE CONDYLE&PLATU MEDIAL&LAT COMPARTMENTS Left 2018    Procedure: ARTHROPLASTY KNEE TOTAL;  Surgeon: Archie Cho MD;  Location: [facility];  Service: Orthopedics   • US GUIDED LIVER BIOPSY  2022       History reviewed. No pertinent family history. Social History     Occupational History   • Not on file   Tobacco Use   • Smoking status: Former     Types: Cigarettes     Quit date: 7/10/1976     Years since quittin.2   • Smokeless tobacco: Never   Vaping Use   • Vaping Use: Never used   Substance and Sexual Activity   • Alcohol use:  Yes     Alcohol/week: 3.0 standard drinks of alcohol     Types: 3 Cans of beer per week     Comment: 2-3 drinks day beer   • Drug use: No   • Sexual activity: Yes       Current Outpatient Medications on File Prior to Visit   Medication Sig   • aspirin (ECOTRIN LOW STRENGTH) 81 mg EC tablet Take 1 tablet (81 mg total) by mouth 2 (two) times a day   • atorvastatin (LIPITOR) 40 mg tablet Take 40 mg by mouth daily   • cyanocobalamin (VITAMIN B-12) 100 mcg tablet Take 100 mcg by mouth daily   • fluticasone (FLONASE) 50 mcg/act nasal spray 2 sprays into each nostril daily as needed   • folic acid (FOLVITE) 1 mg tablet Take 1 tablet by mouth daily   • gabapentin (NEURONTIN) 300 mg capsule Take 1 capsule (300 mg total) by mouth 3 (three) times a day   • loratadine (CLARITIN) 10 mg tablet Take 10 mg by mouth daily as needed     • metoprolol tartrate (LOPRESSOR) 25 mg tablet TAKE 1/2 OF A TABLET (12.5 MG TOTAL) BY MOUTH TWICE A DAY   • multivitamin (THERAGRAN) TABS Take 1 tablet by mouth daily   • naltrexone (REVIA) 50 mg tablet Take 50 mg by mouth daily   • naproxen (NAPROSYN) 500 mg tablet Take 1 tablet (500 mg total) by mouth 2 (two) times a day as needed for mild pain or moderate pain   • ondansetron (ZOFRAN) 4 mg tablet TAKE 1 TABLET BY MOUTH DAILY AS NEEDED FOR NAUSEA OR VOMITING   • pantoprazole (PROTONIX) 20 mg tablet Take 20 mg by mouth daily   • tamsulosin (FLOMAX) 0.4 mg Take 1 capsule by mouth every evening   • Aspirin Low Dose 81 MG chewable tablet Chew 81 mg daily (Patient not taking: Reported on 9/8/2023)   • celecoxib (CeleBREX) 200 mg capsule Take 1 capsule (200 mg total) by mouth daily (Patient not taking: Reported on 4/23/2022)   • cyanocobalamin (VITAMIN B-12) 1000 MCG tablet Take 1 tablet by mouth daily (Patient not taking: Reported on 9/8/2023)   • Ergocalciferol (VITAMIN D2 PO) Take 50,000 Int'l Units by mouth once a week (Patient not taking: Reported on 4/23/2022)   • lactulose 20 g/30 mL Take by mouth daily as needed (Patient not taking: Reported on 4/23/2022)   • methocarbamol (ROBAXIN) 500 mg tablet Take 1 tablet (500 mg total) by mouth every 6 (six) hours as needed for muscle spasms (Patient not taking: Reported on 4/23/2022)   • pantoprazole (PROTONIX) 40 mg tablet Take 40 mg by mouth daily   (Patient not taking: Reported on 10/6/2023)   • thiamine 100 MG tablet Take 1 tablet by mouth daily (Patient not taking: Reported on 9/8/2023)   • traMADol (ULTRAM) 50 mg tablet Take 50 mg by mouth (Patient not taking: Reported on 9/8/2023)     No current facility-administered medications on file prior to visit. Allergies   Allergen Reactions   • Other Other (See Comments)     Sore throat from too many raw oranges.        Physical Exam    /76 (BP Location: Left arm, Patient Position: Sitting, Cuff Size: Adult)   Pulse 64   Temp (!) 97.4 °F (36.3 °C)   Ht 5' 5" (1.651 m)   Wt 59 kg (130 lb)   SpO2 99%   BMI 21.63 kg/m²     Constitutional: normal, well developed, well nourished, alert, in no distress and non-toxic and no overt pain behavior. Eyes: anicteric  HEENT: grossly intact  Neck: supple, symmetric, trachea midline and no masses   Pulmonary:even and unlabored  Cardiovascular:No edema or pitting edema present  Skin:Normal without rashes or lesions and well hydrated  Psychiatric:Mood and affect appropriate  Neurologic:Cranial Nerves II-XII grossly intact Sensation grossly intact; no clonus negative montgomery's. Reflexes 2+ and brisk. Spurling's maneuver positive left sided. Musculoskeletal:normal gait. 5/5 strength bilaterally with AROM in all extremities. Normal heel toe and tip toe walking. Significant pain with cervical and lumbar facet loading bilaterally and with lateral spine rotation. ttp over cervical and lumbar paraspinal muscles. Negative sebastián's test, negative gaenslen's negative SIJ loading bilaterally. Imaging    Multilevel disc degeneration and cervical spondylosis, greatest at C4-5,   C5-6, C6-7.   2. At C4-5, disc ridge complex with uncovertebral arthropathy. There is mild   impression of the ventral thecal sac with severe bilateral neural foraminal   stenosis. 3. At C5-6, disc ridge complex with uncovertebral arthropathy. There is mild   impression of the ventral thecal sac with severe bilateral neural foraminal   stenosis   4. At C6-7, central disc herniation. There is impression of the ventral thecal   sac without cord compression. There is mild bilateral neural foraminal   narrowing. 1. Multilevel disc degeneration and lumbar spondylosis   2. At L3-4, degenerative disc bulging with central disc herniation, ligamentum   flavum thickening facet arthropathy. Is moderate canal stenosis, bilateral   subarticular recess stenosis, moderate right, and severe left neural foraminal   narrowing.    3. At L4-5, disc bulge with central disc herniation, ligamentum flavum thickening, facet arthropathy. There is mild canal stenosis with mild to   moderate right and mild to moderate left neural foraminal stenosis. 4. At L5-S1, disc bulge with right paracentral disc herniation. There is mild   impression of the ventral thecal sac, moderate right subarticular recess   stenosis, moderate bilateral neural foraminal stenosis.

## 2023-10-06 NOTE — H&P (VIEW-ONLY)
Assessment  1. Cervical radiculopathy - Left  -     gabapentin (Neurontin) 600 MG tablet; Take 1 tablet (600 mg total) by mouth 3 (three) times a day    2. Cervical spondylosis - Bilateral    3. Lumbar spondylosis  -     Case request operating room: BLOCK / INJECTION EPIDURAL STEROID LUMBAR L4-L5 or alternate level; Standing  -     Case request operating room: BLOCK / INJECTION EPIDURAL STEROID LUMBAR L4-L5 or alternate level    4. Acute pain of left knee  -     XR knee 3 vw left non injury; Future; Expected date: 10/06/2023    5. Trigger finger, left middle finger  -     Ambulatory referral to Orthopedic Surgery; Future    Greater than 90% relief of pain with improved ability to participate with IADLs after C7-T1 ILESI. Previously reported the following symptomatology:     Left sided cervical radicular pain in C6 dermatomal distribution accompanied by pain limited weakness numbness and paresthesias. Patient has not been a full participant with PT. Chronic pain with decreased participation with IADLs over the past few years. Has been taking NSAIDs and tramadol infrequently with modest benefit. 5/5 strength bilaterally in upper extremities with AROM, positive spurling's maneuver, left sided. Additionally there is positive cervical facet loading eliciting pain, left greater than right. Negative Mccall's, denies any gait instability, saddle anesthesia. On MRI imaging Scattered multilevel spondylotic changes with mild degree of central canal stenosis with severe transforaminal stenosis C4-C7. No cord compression or cord signal abnormality. Risks, benefits alternatives to epidural steroid injections thoroughly discussed with patient. Handouts provided questions answered to patient's satisfaction. Lifestyle modifications extensively discussed including sleep and neck posture, in conjunction with PT.   Will proceed with multimodal pain therapy plan as noted below:    Axial low back pain described primarily by arthritic features. Aching, nagging, indolent, stabbing, throbbing features in axial low back without radicular components. 5/5 strength bilaterally, negative SLR. Positive facet loading maneuvers in lumbar spine elicited pain, positive tenderness to palpation over lumbar paraspinal muscles. Findings correlate with prominent lumbar facet arthropathy seen throughout axial low back on x-ray. Currently he is neurologically intact without gait instability, saddle anesthesia or bowel/bladder abnormality. Risks, benefits alternative to medial branch blocks and subsequent radiofrequency ablation of successful thoroughly discussed with patient. Handouts provided questions answered to patient satisfaction. Plan  -L4-L5 ILESI; f/u 2 weeks post procedure  -trigger finger referral to orthopedics of left middle finger  -xray left knee; f/u result  -gabapentin increased to 600 mg t.i.d. Ordered for patient; counseled regarding sedative effects of taking this medication and provided up titration calendar. Counseled not to take medication while driving or operating heavy machinery/using stairs  -script provided for formal physical therapy for right-sided lumbar radiculopathy, cervical and lumbar spondylosis; Physician directed home exercise plan as per [employer] demonstrated and handouts provided that patient plans to participate with for 1 hour, twice a week for the next 6 weeks. There are risks associated with opioid medications, including dependence, addiction and tolerance. The patient understands and agrees to use these medications only as prescribed. Potential side effects of the medications include, but are not limited to, constipation, drowsiness, addiction, impaired judgment and risk of fatal overdose if not taken as prescribed. The patient was warned against driving while taking sedation medications or operating heavy machinery. The patient voiced understanding. Sharing medications is a felony.  At this point in time, the patient is showing no signs of addiction, abuse, diversion or suicidal ideation. [place] Prescription Drug Monitoring Program report was reviewed and was appropriate      Complete risks and benefits including bleeding, infection, tissue reaction, nerve injury and allergic reaction were discussed. The approach was demonstrated using models and literature was provided. Verbal and written consent was obtained. My impressions and treatment recommendations were discussed in detail with the patient who verbalized understanding and had no further questions. Discharge instructions were provided. I personally saw and examined the patient and I agree with the above discussed plan of care. No orders of the defined types were placed in this encounter. History of Present Illness    Greater than 90% relief of pain with improved ability to participate with IADLs after C7-T1 ILESI. Previously reported the following symptomatology:     Fidencio Oneal is a 81 y.o. male with pmxh of HTN, Leitha Starling, GERD presenting with left-sided neck pain described primarily as radicular nature. The pain radiates in the C6 dermatomal distributions and is primarily left-sided. The pain contributes to significant disability in participation with independent activities of daily living and is accompanied by weakness numbness and paresthesias that are debilitating in nature. The patient describes that overhead maneuvers such as combing hair is significantly limiting with respect to strength in the left arm/hand. The patient notes significant weakness with left hand  as well at times. The patient has not been to physical therapy. He has trialed conservative measures including naproxen and tylenol for the pain but has not trialed any steroids. He has never had interventional pain procedures in the past including any cervical interlaminar epidural steroid injections in the past for this pain.  Denies any gait abnormality, saddle anesthesia or bowel/bladder abnormality. Additionally achy, nagging, indolent, crampy, stabbing pain in his axial low back. The patient describes that the pain is worse with standing for long periods of time on hard surfaces as well as with walking. The patient is a very active individual and feels as though this pain compromises his participation with independent activities of daily living. The pain can be debilitating at times and contribute to significant disability, compromising overall activity and independent activities of daily living. He has not been to PT  Medications the patient has tried in the past include nsaids, tylenol. He describes no radicular symptoms and has good strength. He denies any weakness numbness or paresthesias. The patient denies any bowel or bladder dysfunction, saddle anesthesia or gait instability as well. I have personally reviewed and/or updated the patient's past medical history, past surgical history, family history, social history, current medications, allergies, and vital signs today. Review of Systems   Constitutional: Positive for activity change. HENT: Negative. Eyes: Negative. Respiratory: Negative. Cardiovascular: Negative. Gastrointestinal: Negative. Endocrine: Negative. Genitourinary: Negative. Musculoskeletal: Positive for arthralgias, back pain, myalgias, neck pain and neck stiffness. Negative for gait problem. Skin: Negative. Allergic/Immunologic: Negative. Neurological: Positive for weakness and numbness. Hematological: Negative. Psychiatric/Behavioral: Negative. All other systems reviewed and are negative.       Patient Active Problem List   Diagnosis   • Cervical spondylosis - Bilateral   • Cervical radiculopathy - Left   • Lumbar spondylosis   • Lumbar radiculopathy       Past Medical History:   Diagnosis Date   • Fatty liver    • Food allergy     strawberries- tickling in throat   • GERD (gastroesophageal reflux disease)    • OA (osteoarthritis)     left knee   • Seasonal allergies    • Wears glasses        Past Surgical History:   Procedure Laterality Date   • CATARACT EXTRACTION Bilateral    • EPIDURAL BLOCK INJECTION N/A 2023    Procedure: BLOCK / INJECTION EPIDURAL STEROID CERVICAL C7-T1;  Surgeon: Yumiko Acosta MD;  Location:  [facility];  Service: Pain Management    • ESOPHAGOGASTRODUODENOSCOPY     • KNEE ARTHROSCOPY Left    • LIVER BIOPSY     • CA ARTHRP KNE CONDYLE&PLATU MEDIAL&LAT COMPARTMENTS Left 2018    Procedure: ARTHROPLASTY KNEE TOTAL;  Surgeon: Geneva Lopez MD;  Location: [facility];  Service: Orthopedics   • US GUIDED LIVER BIOPSY  2022       History reviewed. No pertinent family history. Social History     Occupational History   • Not on file   Tobacco Use   • Smoking status: Former     Types: Cigarettes     Quit date: 7/10/1976     Years since quittin.2   • Smokeless tobacco: Never   Vaping Use   • Vaping Use: Never used   Substance and Sexual Activity   • Alcohol use:  Yes     Alcohol/week: 3.0 standard drinks of alcohol     Types: 3 Cans of beer per week     Comment: 2-3 drinks day beer   • Drug use: No   • Sexual activity: Yes       Current Outpatient Medications on File Prior to Visit   Medication Sig   • aspirin (ECOTRIN LOW STRENGTH) 81 mg EC tablet Take 1 tablet (81 mg total) by mouth 2 (two) times a day   • atorvastatin (LIPITOR) 40 mg tablet Take 40 mg by mouth daily   • cyanocobalamin (VITAMIN B-12) 100 mcg tablet Take 100 mcg by mouth daily   • fluticasone (FLONASE) 50 mcg/act nasal spray 2 sprays into each nostril daily as needed   • folic acid (FOLVITE) 1 mg tablet Take 1 tablet by mouth daily   • gabapentin (NEURONTIN) 300 mg capsule Take 1 capsule (300 mg total) by mouth 3 (three) times a day   • loratadine (CLARITIN) 10 mg tablet Take 10 mg by mouth daily as needed     • metoprolol tartrate (LOPRESSOR) 25 mg tablet TAKE 1/2 OF A TABLET (12.5 MG TOTAL) BY MOUTH TWICE A DAY   • multivitamin (THERAGRAN) TABS Take 1 tablet by mouth daily   • naltrexone (REVIA) 50 mg tablet Take 50 mg by mouth daily   • naproxen (NAPROSYN) 500 mg tablet Take 1 tablet (500 mg total) by mouth 2 (two) times a day as needed for mild pain or moderate pain   • ondansetron (ZOFRAN) 4 mg tablet TAKE 1 TABLET BY MOUTH DAILY AS NEEDED FOR NAUSEA OR VOMITING   • pantoprazole (PROTONIX) 20 mg tablet Take 20 mg by mouth daily   • tamsulosin (FLOMAX) 0.4 mg Take 1 capsule by mouth every evening   • Aspirin Low Dose 81 MG chewable tablet Chew 81 mg daily (Patient not taking: Reported on 9/8/2023)   • celecoxib (CeleBREX) 200 mg capsule Take 1 capsule (200 mg total) by mouth daily (Patient not taking: Reported on 4/23/2022)   • cyanocobalamin (VITAMIN B-12) 1000 MCG tablet Take 1 tablet by mouth daily (Patient not taking: Reported on 9/8/2023)   • Ergocalciferol (VITAMIN D2 PO) Take 50,000 Int'l Units by mouth once a week (Patient not taking: Reported on 4/23/2022)   • lactulose 20 g/30 mL Take by mouth daily as needed (Patient not taking: Reported on 4/23/2022)   • methocarbamol (ROBAXIN) 500 mg tablet Take 1 tablet (500 mg total) by mouth every 6 (six) hours as needed for muscle spasms (Patient not taking: Reported on 4/23/2022)   • pantoprazole (PROTONIX) 40 mg tablet Take 40 mg by mouth daily   (Patient not taking: Reported on 10/6/2023)   • thiamine 100 MG tablet Take 1 tablet by mouth daily (Patient not taking: Reported on 9/8/2023)   • traMADol (ULTRAM) 50 mg tablet Take 50 mg by mouth (Patient not taking: Reported on 9/8/2023)     No current facility-administered medications on file prior to visit. Allergies   Allergen Reactions   • Other Other (See Comments)     Sore throat from too many raw oranges.        Physical Exam    /76 (BP Location: Left arm, Patient Position: Sitting, Cuff Size: Adult)   Pulse 64   Temp (!) 97.4 °F (36.3 °C)   Ht 5' 5" (1.651 m)   Wt 59 kg (130 lb)   SpO2 99%   BMI 21.63 kg/m²     Constitutional: normal, well developed, well nourished, alert, in no distress and non-toxic and no overt pain behavior. Eyes: anicteric  HEENT: grossly intact  Neck: supple, symmetric, trachea midline and no masses   Pulmonary:even and unlabored  Cardiovascular:No edema or pitting edema present  Skin:Normal without rashes or lesions and well hydrated  Psychiatric:Mood and affect appropriate  Neurologic:Cranial Nerves II-XII grossly intact Sensation grossly intact; no clonus negative montgomery's. Reflexes 2+ and brisk. Spurling's maneuver positive left sided. Musculoskeletal:normal gait. 5/5 strength bilaterally with AROM in all extremities. Normal heel toe and tip toe walking. Significant pain with cervical and lumbar facet loading bilaterally and with lateral spine rotation. ttp over cervical and lumbar paraspinal muscles. Negative sebastián's test, negative gaenslen's negative SIJ loading bilaterally. Imaging    Multilevel disc degeneration and cervical spondylosis, greatest at C4-5,   C5-6, C6-7.   2. At C4-5, disc ridge complex with uncovertebral arthropathy. There is mild   impression of the ventral thecal sac with severe bilateral neural foraminal   stenosis. 3. At C5-6, disc ridge complex with uncovertebral arthropathy. There is mild   impression of the ventral thecal sac with severe bilateral neural foraminal   stenosis   4. At C6-7, central disc herniation. There is impression of the ventral thecal   sac without cord compression. There is mild bilateral neural foraminal   narrowing. 1. Multilevel disc degeneration and lumbar spondylosis   2. At L3-4, degenerative disc bulging with central disc herniation, ligamentum   flavum thickening facet arthropathy. Is moderate canal stenosis, bilateral   subarticular recess stenosis, moderate right, and severe left neural foraminal   narrowing.    3. At L4-5, disc bulge with central disc herniation, ligamentum flavum thickening, facet arthropathy. There is mild canal stenosis with mild to   moderate right and mild to moderate left neural foraminal stenosis. 4. At L5-S1, disc bulge with right paracentral disc herniation. There is mild   impression of the ventral thecal sac, moderate right subarticular recess   stenosis, moderate bilateral neural foraminal stenosis.

## 2023-10-11 NOTE — DISCHARGE INSTR - AVS FIRST PAGE
YOUR 2 WEEK FOLLOW UP HAS BEEN SCHEDULED; IF YOU WISH TO CHANGE THE FOLLOW UP, PLEASE CALL THE [facility] AT [place]: [phone]    Epidural Steroid Injection   WHAT YOU NEED TO KNOW:   An epidural steroid injection (JEAN CLAUDE) is a procedure to inject steroid medicine into the epidural space. The epidural space is between your spinal cord and vertebrae. Steroids reduce inflammation and fluid buildup in your spine that may be causing pain. You may be given pain medicine along with the steroids. DISCHARGE INSTRUCTIONS:   Call your local emergency number (911 in the [address]) if:   You have a seizure. You have trouble moving your legs. Seek care immediately if:   Blood soaks through your bandage. You have a fever or chills, severe back pain, and the procedure area is sensitive to the touch. You cannot control when you urinate or have a bowel movement. Call your doctor if:   You have weakness or numbness in your legs. Your wound is red, swollen, or draining pus. You have nausea or are vomiting. Your face or neck is red and you feel warm. You have more pain than you had before the procedure. You have swelling in your hands or feet. You have questions or concerns about your condition or care. Care for your wound as directed: You may remove the bandage before you go to bed the day of your procedure. You may take a shower, but do not take a bath for at least 24 hours. Self-care:   Do not drive,  use machines, or do strenuous activity for 24 hours after your procedure or as directed. Continue other treatments  as directed. Steroid injections alone will not control your pain. The injections are meant to be used with other treatments, such as physical therapy. Follow up with your doctor as directed:  Write down your questions so you remember to ask them during your visits.    © Copyright Radha V[employer]  Information is for End User's use only and may not be sold, redistributed or otherwise used for commercial purposes. The above information is an  only. It is not intended as medical advice for individual conditions or treatments. Talk to your doctor, nurse or pharmacist before following any medical regimen to see if it is safe and effective for you.

## 2023-10-12 ENCOUNTER — APPOINTMENT (OUTPATIENT)
Dept: RADIOLOGY | Facility: HOSPITAL | Age: 73
End: 2023-10-12
Payer: MEDICARE

## 2023-10-12 ENCOUNTER — HOSPITAL ENCOUNTER (OUTPATIENT)
Facility: HOSPITAL | Age: 73
Setting detail: OUTPATIENT SURGERY
Discharge: HOME | End: 2023-10-12
Attending: ANESTHESIOLOGY | Admitting: ANESTHESIOLOGY
Payer: MEDICARE

## 2023-10-12 VITALS
HEART RATE: 78 BPM | BODY MASS INDEX: 21.66 KG/M2 | OXYGEN SATURATION: 99 % | TEMPERATURE: 97.8 F | HEIGHT: 65 IN | SYSTOLIC BLOOD PRESSURE: 136 MMHG | WEIGHT: 130 LBS | RESPIRATION RATE: 16 BRPM | DIASTOLIC BLOOD PRESSURE: 75 MMHG

## 2023-10-12 PROCEDURE — 62323 NJX INTERLAMINAR LMBR/SAC: CPT | Performed by: ANESTHESIOLOGY

## 2023-10-12 RX ORDER — METHYLPREDNISOLONE ACETATE 80 MG/ML
80 INJECTION, SUSPENSION INTRA-ARTICULAR; INTRALESIONAL; INTRAMUSCULAR; PARENTERAL; SOFT TISSUE ONCE
Status: COMPLETED | OUTPATIENT
Start: 2023-10-12 | End: 2023-10-12

## 2023-10-12 RX ORDER — LIDOCAINE HYDROCHLORIDE 10 MG/ML
5 INJECTION, SOLUTION EPIDURAL; INFILTRATION; INTRACAUDAL; PERINEURAL ONCE
Status: COMPLETED | OUTPATIENT
Start: 2023-10-12 | End: 2023-10-12

## 2023-10-12 RX ORDER — 0.9 % SODIUM CHLORIDE 0.9 %
3 VIAL (ML) INJECTION ONCE
Status: COMPLETED | OUTPATIENT
Start: 2023-10-12 | End: 2023-10-12

## 2023-10-12 NOTE — PROCEDURES
Pre-procedure Diagnosis:       Lumbar Radiculopathy  Post-procedure Diagnosis:     Lumbar Radiculopathy  Procedure Title(s):                  1. [L4-L5] interlaminar epidural steroid injection                                                  2. Intraoperative fluoroscopy  Attending Surgeon:                 Isidro Rockwell MD  Anesthesia:                             Local      Indications: The patient is a  81y.o. year-old male  with a diagnosis of Lumbar Radiculopathy. The patient's history and physical exam were reviewed. The risks, benefits and alternatives to the procedure were discussed, and all questions were answered to the patient's satisfaction. The patient agreed to proceed, and written informed consent was obtained. Procedure in Detail: The patient was brought into the procedure room and placed in the prone position on the fluoroscopy table. The area of the lumbar spine was prepped with chlorhexidine gluconate solution times one and draped in a sterile manner. The [L4-L5] interspace was identified and marked under AP fluoroscopy. The skin and subcutaneous tissues in the area were anesthetized with 1% lidocaine. A 18-gauge Tuohy epidural needle was directed toward the interspace under fluoroscopic guidance until the ligamentum flavum was engaged. From this point, a loss of resistance technique with saline was used to identify entrance of the needle into the epidural space. Once an appropriate loss was obtained, negative aspiration was confirmed, and 1 ml Omnipaque 240 contrast solution was injected. An appropriate epidurogram was noted. Then, after negative aspiration, a solution consisting of 1-mL depo-medrol (80mg/mL) and 3-mL preservative-free saline was easily injected. The needle was removed with a 1% lidocaine flush. The patient's back was cleaned and a bandage was placed over the site of needle insertion.      Disposition: The patient tolerated the procedure well, and there were no apparent complications. The patient was taken to the recovery area where written discharge instructions for the procedure were given.       Estimated Blood Loss: None  Specimens Obtained: N/A

## 2023-10-12 NOTE — INTERVAL H&P NOTE
H&P reviewed. After examining the patient I find no changes in the patients condition since the H&P had been written.     Vitals:    10/12/23 0901   BP: 139/74   Pulse: 85   Resp: 18   Temp: 97.6 °F (36.4 °C)   SpO2: 100%

## 2023-10-12 NOTE — OP NOTE
OPERATIVE REPORT  PATIENT NAME: Carrolyn Barthel    :  1950  MRN: [id number]  Pt Location:  GI ROOM 01    SURGERY DATE: 10/12/2023    Surgeon(s) and Role: Mary Bergman MD - Primary    Preop Diagnosis:  Lumbar spondylosis [M47.816]    Post-Op Diagnosis Codes:     * Lumbar spondylosis [M47.816]    Procedure(s):  BLOCK / INJECTION EPIDURAL STEROID LUMBAR L4-L5    Specimen(s):  * No specimens in log *    Estimated Blood Loss:   Minimal    Anesthesia Type:   Local    Operative Indications:  Lumbar spondylosis [M47.816]    Operative Findings:  L4-L5 epidurogram    Complications:   None    Procedure and Technique:  Please see detailed procedure note    I was present for the entire procedure.     Patient Disposition:  PACU     SIGNATURE: Mary Bergman MD  DATE: 2023  TIME: 9:40 AM

## 2023-10-17 ENCOUNTER — TELEPHONE (OUTPATIENT)
Dept: PAIN MEDICINE | Facility: CLINIC | Age: 73
End: 2023-10-17

## 2023-10-17 NOTE — TELEPHONE ENCOUNTER
----- Message from Art Almonte MD sent at 10/17/2023  7:46 AM EDT -----  Patient has stable knee replacement on xray; should f/u with ortho regarding further reccs.  Please relay thanks  ----- Message -----  From: Interface, Radiology Results In  Sent: 10/16/2023   9:09 PM EDT  To: Art Almonte MD

## 2023-10-19 ENCOUNTER — TELEPHONE (OUTPATIENT)
Dept: PAIN MEDICINE | Facility: CLINIC | Age: 73
End: 2023-10-19

## 2023-10-30 ENCOUNTER — APPOINTMENT (EMERGENCY)
Dept: NON INVASIVE DIAGNOSTICS | Facility: HOSPITAL | Age: 73
End: 2023-10-30
Payer: MEDICARE

## 2023-10-30 ENCOUNTER — HOSPITAL ENCOUNTER (EMERGENCY)
Facility: HOSPITAL | Age: 73
Discharge: HOME | End: 2023-10-30
Attending: EMERGENCY MEDICINE
Payer: MEDICARE

## 2023-10-30 ENCOUNTER — OFFICE VISIT (OUTPATIENT)
Dept: PAIN MEDICINE | Facility: CLINIC | Age: 73
End: 2023-10-30
Payer: MEDICARE

## 2023-10-30 VITALS
SYSTOLIC BLOOD PRESSURE: 136 MMHG | DIASTOLIC BLOOD PRESSURE: 88 MMHG | HEART RATE: 83 BPM | TEMPERATURE: 97.9 F | BODY MASS INDEX: 21.83 KG/M2 | WEIGHT: 131 LBS | HEIGHT: 65 IN | OXYGEN SATURATION: 99 %

## 2023-10-30 VITALS
OXYGEN SATURATION: 98 % | RESPIRATION RATE: 18 BRPM | DIASTOLIC BLOOD PRESSURE: 75 MMHG | HEART RATE: 71 BPM | WEIGHT: 131 LBS | BODY MASS INDEX: 21.83 KG/M2 | SYSTOLIC BLOOD PRESSURE: 120 MMHG | HEIGHT: 65 IN | TEMPERATURE: 98.1 F

## 2023-10-30 DIAGNOSIS — R25.2 LEG CRAMPS: ICD-10-CM

## 2023-10-30 DIAGNOSIS — E83.42 HYPOMAGNESEMIA: Primary | ICD-10-CM

## 2023-10-30 DIAGNOSIS — I82.403 DEEP VEIN THROMBOSIS (DVT) OF BOTH LOWER EXTREMITIES, UNSPECIFIED CHRONICITY, UNSPECIFIED VEIN (HCC): Primary | ICD-10-CM

## 2023-10-30 DIAGNOSIS — M79.604 BILATERAL LOWER EXTREMITY PAIN: ICD-10-CM

## 2023-10-30 DIAGNOSIS — M47.816 LUMBAR SPONDYLOSIS: ICD-10-CM

## 2023-10-30 DIAGNOSIS — M79.605 BILATERAL LOWER EXTREMITY PAIN: ICD-10-CM

## 2023-10-30 DIAGNOSIS — I99.9 VASCULAR DISEASE: ICD-10-CM

## 2023-10-30 LAB
ALBUMIN SERPL BCP-MCNC: 3.9 G/DL (ref 3.5–5)
ALP SERPL-CCNC: 85 U/L (ref 34–104)
ALT SERPL W P-5'-P-CCNC: 18 U/L (ref 7–52)
ANION GAP SERPL CALCULATED.3IONS-SCNC: 11 MMOL/L
APTT PPP: 25 SECONDS (ref 23–37)
AST SERPL W P-5'-P-CCNC: 28 U/L (ref 13–39)
BASOPHILS # BLD AUTO: 0.04 THOUSANDS/ÂΜL (ref 0–0.1)
BASOPHILS NFR BLD AUTO: 1 % (ref 0–1)
BILIRUB SERPL-MCNC: 1.02 MG/DL (ref 0.2–1)
BUN SERPL-MCNC: 8 MG/DL (ref 5–25)
CALCIUM SERPL-MCNC: 8.9 MG/DL (ref 8.4–10.2)
CHLORIDE SERPL-SCNC: 104 MMOL/L (ref 96–108)
CK SERPL-CCNC: 72 U/L (ref 39–308)
CO2 SERPL-SCNC: 23 MMOL/L (ref 21–32)
CREAT SERPL-MCNC: 0.72 MG/DL (ref 0.6–1.3)
EOSINOPHIL # BLD AUTO: 0.09 THOUSAND/ÂΜL (ref 0–0.61)
EOSINOPHIL NFR BLD AUTO: 1 % (ref 0–6)
ERYTHROCYTE [DISTWIDTH] IN BLOOD BY AUTOMATED COUNT: 15.7 % (ref 11.6–15.1)
GFR SERPL CREATININE-BSD FRML MDRD: 94 ML/MIN/1.73SQ M
GLUCOSE SERPL-MCNC: 97 MG/DL (ref 65–140)
HCT VFR BLD AUTO: 37.5 % (ref 36.5–49.3)
HGB BLD-MCNC: 12.4 G/DL (ref 12–17)
IMM GRANULOCYTES # BLD AUTO: 0.05 THOUSAND/UL (ref 0–0.2)
IMM GRANULOCYTES NFR BLD AUTO: 1 % (ref 0–2)
INR PPP: 0.99 (ref 0.84–1.19)
LYMPHOCYTES # BLD AUTO: 0.99 THOUSANDS/ÂΜL (ref 0.6–4.47)
LYMPHOCYTES NFR BLD AUTO: 15 % (ref 14–44)
MAGNESIUM SERPL-MCNC: 1.7 MG/DL (ref 1.9–2.7)
MCH RBC QN AUTO: 32.5 PG (ref 26.8–34.3)
MCHC RBC AUTO-ENTMCNC: 33.1 G/DL (ref 31.4–37.4)
MCV RBC AUTO: 98 FL (ref 82–98)
MONOCYTES # BLD AUTO: 0.69 THOUSAND/ÂΜL (ref 0.17–1.22)
MONOCYTES NFR BLD AUTO: 11 % (ref 4–12)
NEUTROPHILS # BLD AUTO: 4.66 THOUSANDS/ÂΜL (ref 1.85–7.62)
NEUTS SEG NFR BLD AUTO: 71 % (ref 43–75)
NRBC BLD AUTO-RTO: 0 /100 WBCS
PLATELET # BLD AUTO: 127 THOUSANDS/UL (ref 149–390)
PMV BLD AUTO: 9.4 FL (ref 8.9–12.7)
POTASSIUM SERPL-SCNC: 3.5 MMOL/L (ref 3.5–5.3)
PROT SERPL-MCNC: 6.7 G/DL (ref 6.4–8.4)
PROTHROMBIN TIME: 13.4 SECONDS (ref 11.6–14.5)
RBC # BLD AUTO: 3.81 MILLION/UL (ref 3.88–5.62)
SODIUM SERPL-SCNC: 138 MMOL/L (ref 135–147)
WBC # BLD AUTO: 6.52 THOUSAND/UL (ref 4.31–10.16)

## 2023-10-30 PROCEDURE — 82550 ASSAY OF CK (CPK): CPT | Performed by: EMERGENCY MEDICINE

## 2023-10-30 PROCEDURE — 93970 EXTREMITY STUDY: CPT

## 2023-10-30 PROCEDURE — 83735 ASSAY OF MAGNESIUM: CPT | Performed by: EMERGENCY MEDICINE

## 2023-10-30 PROCEDURE — 99284 EMERGENCY DEPT VISIT MOD MDM: CPT

## 2023-10-30 PROCEDURE — 85730 THROMBOPLASTIN TIME PARTIAL: CPT | Performed by: EMERGENCY MEDICINE

## 2023-10-30 PROCEDURE — 80053 COMPREHEN METABOLIC PANEL: CPT | Performed by: EMERGENCY MEDICINE

## 2023-10-30 PROCEDURE — 36415 COLL VENOUS BLD VENIPUNCTURE: CPT | Performed by: EMERGENCY MEDICINE

## 2023-10-30 PROCEDURE — 99284 EMERGENCY DEPT VISIT MOD MDM: CPT | Performed by: EMERGENCY MEDICINE

## 2023-10-30 PROCEDURE — 99215 OFFICE O/P EST HI 40 MIN: CPT | Performed by: ANESTHESIOLOGY

## 2023-10-30 PROCEDURE — 85610 PROTHROMBIN TIME: CPT | Performed by: EMERGENCY MEDICINE

## 2023-10-30 PROCEDURE — 85025 COMPLETE CBC W/AUTO DIFF WBC: CPT | Performed by: EMERGENCY MEDICINE

## 2023-10-30 RX ORDER — LANOLIN ALCOHOL/MO/W.PET/CERES
400 CREAM (GRAM) TOPICAL ONCE
Status: COMPLETED | OUTPATIENT
Start: 2023-10-30 | End: 2023-10-30

## 2023-10-30 RX ORDER — CALCIUM CARBONATE/VITAMIN D3 500-10/5ML
400 LIQUID (ML) ORAL DAILY
Qty: 10 TABLET | Refills: 0 | Status: SHIPPED | OUTPATIENT
Start: 2023-10-30 | End: 2023-11-09

## 2023-10-30 RX ADMIN — Medication 400 MG: at 15:58

## 2023-10-30 NOTE — PATIENT INSTRUCTIONS
Deep Vein Thrombosis   AMBULATORY CARE:   A deep vein thrombosis (DVT)  is a blood clot that forms in a deep vein of the body. The deep veins in the legs, thighs, and hips are the most common sites for DVT. A DVT can also occur in a deep vein within your arms. The clot prevents the normal flow of blood in the vein. The blood backs up and causes pain and swelling. The DVT can break into smaller pieces and travel to your lungs and cause a blockage called a pulmonary embolism. A pulmonary embolism can become life-threatening. Common symptoms include the following:   Swelling    Redness    Warmth, pain, or tenderness       Call your local emergency number (911 in the [address]) if:   You feel lightheaded, short of breath, and have chest pain. You cough up blood. Seek care immediately if:   Your arm or leg feels warm, tender, and painful. It may look swollen and red. Call your doctor or hematologist if:   You have questions or concerns about your condition or care. Treatment for a DVT  may include any of the following:  Blood thinners  help prevent blood clots. Clots can cause strokes, heart attacks, and death. Many types of blood thinners are available. Your healthcare provider will give you specific instructions for the type you are given. The following are general safety guidelines to follow while you are taking a blood thinner:    Watch for bleeding and bruising. Watch for bleeding from your gums or nose. Watch for blood in your urine and bowel movements. Use a soft washcloth on your skin, and a soft toothbrush to brush your teeth. This can keep your skin and gums from bleeding. If you shave, use an electric shaver. Do not play contact sports. Tell your dentist and other healthcare providers that you take a blood thinner. Wear a bracelet or necklace that says you take this medicine. Do not start or stop any other medicines or supplements unless your healthcare provider tells you to.  Many medicines and supplements cannot be used with blood thinners. Take your blood thinner exactly as prescribed by your healthcare provider. Do not skip does or take less than prescribed. Tell your provider right away if you forget to take your blood thinner, or if you take too much. Clot busters  are emergency medicines that work to dissolve blood clots. A vena cava filter  may be placed inside your vena cava to treat your DVT. The vena cava is a large vein that brings blood from your lower body up to your heart. The filter may help trap pieces of a blood clot and prevent them from going into your lungs. Surgery  called a thrombectomy may be done to remove the clot. A procedure called thrombolysis may instead be done to inject a clot buster that helps break the clot apart. Manage a DVT:   Wear pressure stockings as directed. The stockings put pressure on your legs. This improves blood flow and helps prevent clots. Wear the stockings during the day. Do not wear them when you sleep. Elevate your legs above the level of your heart. Elevate your legs when you sit or lie down, as often as you can. This will help decrease swelling and pain. Prop your legs on pillows or blankets to keep them elevated comfortably. Prevent a DVT:   Exercise regularly to help increase your blood flow. Walking is a good low-impact exercise. Talk to your healthcare provider about the best exercise plan for you. Change your body position or move around often. Move and stretch in your seat several times each hour if you travel by car or work at a desk. In an airplane, get up and walk every hour. Move your legs by tightening and releasing your leg muscles while sitting. You can move your legs while sitting by raising and lowering your heels. Keep your toes on the floor while you do this. You can also raise and lower your toes while keeping your heels on the floor. Maintain a healthy weight.   Ask your healthcare provider what a healthy weight is for you. Ask him or her to help you create a weight loss plan if you are overweight. Do not smoke. Nicotine and other chemicals in cigarettes and cigars can damage blood vessels and make it more difficult to manage your DVT. Ask your healthcare provider for information if you currently smoke and need help to quit. E-cigarettes or smokeless tobacco still contain nicotine. Talk to your healthcare provider before you use these products. Ask about birth control if you are a woman who takes the pill. A birth control pill increases the risk for PE in certain women. The risk is higher if you are also older than 35, smoke cigarettes, or have a blood clotting disorder. Talk to your healthcare provider about other ways to prevent pregnancy, such as a cervical cap or intrauterine device (IUD). Follow up with your doctor or hematologist as directed: You may need to come in regularly for scans to check for blood clots. Your blood may be checked to see how long it takes to clot. Your doctor or specialist will tell you if you need to have this test and how often to have it. Write down your questions so you remember to ask them during your visits. © Copyright [employer] 2023 Information is for End User's use only and may not be sold, redistributed or otherwise used for commercial purposes. The above information is an  only. It is not intended as medical advice for individual conditions or treatments. Talk to your doctor, nurse or pharmacist before following any medical regimen to see if it is safe and effective for you.

## 2023-10-30 NOTE — PROGRESS NOTES
Assessment  1. Deep vein thrombosis (DVT) of both lower extremities, unspecified chronicity, unspecified vein (HCC)  -     VAS reflux lower limb venous duplex study with reflux assessment, complete bilateral; Future; Expected date: 10/30/2023    2. Vascular disease  -     VAS MARLA & waveform analysis, multiple levels; Future; Expected date: 10/30/2023    3. Bilateral lower extremity pain  -     VAS reflux lower limb venous duplex study with reflux assessment, complete bilateral; Future; Expected date: 10/30/2023    4. Lumbar spondylosis    Greater than 90% relief of neck pain with improved ability to participate with IADLs after C7-T1 ILESI and subsequent ILESI at L4-L5 to target axial low back pain. Bilateral lower extremity crampy pain in calf muscles with 2+ pitting edeman in LLE; bruised left calf, dorsal pedal pulses intact. Concern for DVT/other vascular disease. Previously reported the following symptomatology:     Left sided cervical radicular pain in C6 dermatomal distribution accompanied by pain limited weakness numbness and paresthesias. Patient has not been a full participant with PT. Chronic pain with decreased participation with IADLs over the past few years. Has been taking NSAIDs and tramadol infrequently with modest benefit. 5/5 strength bilaterally in upper extremities with AROM, positive spurling's maneuver, left sided. Additionally there is positive cervical facet loading eliciting pain, left greater than right. Negative Mccall's, denies any gait instability, saddle anesthesia. On MRI imaging Scattered multilevel spondylotic changes with mild degree of central canal stenosis with severe transforaminal stenosis C4-C7. No cord compression or cord signal abnormality. Risks, benefits alternatives to epidural steroid injections thoroughly discussed with patient. Handouts provided questions answered to patient's satisfaction.   Lifestyle modifications extensively discussed including sleep and neck posture, in conjunction with PT. Will proceed with multimodal pain therapy plan as noted below:    Axial low back pain described primarily by arthritic features. Aching, nagging, indolent, stabbing, throbbing features in axial low back without radicular components. 5/5 strength bilaterally, negative SLR. Positive facet loading maneuvers in lumbar spine elicited pain, positive tenderness to palpation over lumbar paraspinal muscles. Findings correlate with prominent lumbar facet arthropathy seen throughout axial low back on x-ray. Currently he is neurologically intact without gait instability, saddle anesthesia or bowel/bladder abnormality. Risks, benefits alternative to medial branch blocks and subsequent radiofrequency ablation of successful thoroughly discussed with patient. Handouts provided questions answered to patient satisfaction. Plan  -sent to ED for bilateral lower extremity VAS duplex, ABIs  -trigger finger referral to orthopedics of left middle finger  -xray left knee; f/u result  -gabapentin increased to 600 mg t.i.d. Ordered for patient; counseled regarding sedative effects of taking this medication and provided up titration calendar. Counseled not to take medication while driving or operating heavy machinery/using stairs  -script provided for formal physical therapy for cervical radiculopathy, cervical and lumbar spondylosis; Physician directed home exercise plan as per [facility] demonstrated and handouts provided that patient plans to participate with for 1 hour, twice a week for the next 6 weeks. There are risks associated with opioid medications, including dependence, addiction and tolerance. The patient understands and agrees to use these medications only as prescribed. Potential side effects of the medications include, but are not limited to, constipation, drowsiness, addiction, impaired judgment and risk of fatal overdose if not taken as prescribed.  The patient was warned against driving while taking sedation medications or operating heavy machinery. The patient voiced understanding. Sharing medications is a felony. At this point in time, the patient is showing no signs of addiction, abuse, diversion or suicidal ideation. [place] Prescription Drug Monitoring Program report was reviewed and was appropriate      Complete risks and benefits including bleeding, infection, tissue reaction, nerve injury and allergic reaction were discussed. The approach was demonstrated using models and literature was provided. Verbal and written consent was obtained. My impressions and treatment recommendations were discussed in detail with the patient who verbalized understanding and had no further questions. Discharge instructions were provided. I personally saw and examined the patient and I agree with the above discussed plan of care. New Medications Ordered This Visit   Medications    Homeopathic Products (LEG CRAMP RELIEF PO)     Sig: Take by mouth         History of Present Illness    Greater than 90% relief of neck pain with improved ability to participate with IADLs after C7-T1 ILESI and subsequent ILESI at L4-L5 to target axial low back pain. Bilateral lower extremity crampy pain in calf muscles with 2+ pitting edeman in LLE; bruised left calf, dorsal pedal pulses intact. Previously reported the following symptomatology:     Justin Layne is a 81 y.o. male with pmxh of HTN, Lorelie Gazella, GERD presenting with left-sided neck pain described primarily as radicular nature. The pain radiates in the C6 dermatomal distributions and is primarily left-sided. The pain contributes to significant disability in participation with independent activities of daily living and is accompanied by weakness numbness and paresthesias that are debilitating in nature. The patient describes that overhead maneuvers such as combing hair is significantly limiting with respect to strength in the left arm/hand.  The patient notes significant weakness with left hand  as well at times. The patient has not been to physical therapy. He has trialed conservative measures including naproxen and tylenol for the pain but has not trialed any steroids. He has never had interventional pain procedures in the past including any cervical interlaminar epidural steroid injections in the past for this pain. Denies any gait abnormality, saddle anesthesia or bowel/bladder abnormality. Additionally achy, nagging, indolent, crampy, stabbing pain in his axial low back. The patient describes that the pain is worse with standing for long periods of time on hard surfaces as well as with walking. The patient is a very active individual and feels as though this pain compromises his participation with independent activities of daily living. The pain can be debilitating at times and contribute to significant disability, compromising overall activity and independent activities of daily living. He has not been to PT  Medications the patient has tried in the past include nsaids, tylenol. He describes no radicular symptoms and has good strength. He denies any weakness numbness or paresthesias. The patient denies any bowel or bladder dysfunction, saddle anesthesia or gait instability as well. I have personally reviewed and/or updated the patient's past medical history, past surgical history, family history, social history, current medications, allergies, and vital signs today. Review of Systems   Constitutional:  Positive for activity change. HENT: Negative. Eyes: Negative. Respiratory: Negative. Cardiovascular: Negative. Gastrointestinal: Negative. Endocrine: Negative. Genitourinary: Negative. Musculoskeletal:  Positive for arthralgias, back pain, myalgias, neck pain and neck stiffness. Negative for gait problem. Skin: Negative. Allergic/Immunologic: Negative.     Neurological:  Positive for weakness and numbness. Hematological: Negative. Psychiatric/Behavioral: Negative. All other systems reviewed and are negative. Patient Active Problem List   Diagnosis    Cervical spondylosis - Bilateral    Cervical radiculopathy - Left    Lumbar spondylosis    Lumbar radiculopathy    Deep vein thrombosis (DVT) of both lower extremities (HCC)    Bilateral lower extremity pain    Vascular disease       Past Medical History:   Diagnosis Date    Fatty liver     Food allergy     strawberries- tickling in throat    GERD (gastroesophageal reflux disease)     Leg cramps     OA (osteoarthritis)     left knee    Seasonal allergies     Wears glasses        Past Surgical History:   Procedure Laterality Date    CATARACT EXTRACTION Bilateral     CHOLECYSTECTOMY      EPIDURAL BLOCK INJECTION N/A 2023    Procedure: BLOCK / INJECTION EPIDURAL STEROID CERVICAL C7-T1;  Surgeon: Amanda Jules MD;  Location: [facility];  Service: Pain Management     EPIDURAL BLOCK INJECTION N/A 10/12/2023    Procedure: BLOCK / INJECTION EPIDURAL STEROID LUMBAR L4-L5;  Surgeon: Amanda Jules MD;  Location: [facility];  Service: Pain Management     ESOPHAGOGASTRODUODENOSCOPY      HAND SURGERY Right     KNEE ARTHROSCOPY Left     LIVER BIOPSY      LA ARTHRP KNE CONDYLE&PLATU MEDIAL&LAT COMPARTMENTS Left 2018    Procedure: ARTHROPLASTY KNEE TOTAL;  Surgeon: Aubrey Torres MD;  Location: [facility];  Service: Orthopedics    US GUIDED LIVER BIOPSY  2022       History reviewed. No pertinent family history. Social History     Occupational History    Not on file   Tobacco Use    Smoking status: Former     Types: Cigarettes     Quit date: 7/10/1976     Years since quittin.3    Smokeless tobacco: Never   Vaping Use    Vaping Use: Never used   Substance and Sexual Activity    Alcohol use:  Yes     Alcohol/week: 3.0 standard drinks of alcohol     Types: 3 Cans of beer per week     Comment: 2-3 drinks day beer    Drug use: No    Sexual activity: Yes       Current Outpatient Medications on File Prior to Visit   Medication Sig    aspirin (ECOTRIN LOW STRENGTH) 81 mg EC tablet Take 1 tablet (81 mg total) by mouth 2 (two) times a day    atorvastatin (LIPITOR) 40 mg tablet Take 40 mg by mouth daily    cyanocobalamin (VITAMIN B-12) 100 mcg tablet Take 100 mcg by mouth daily    fluticasone (FLONASE) 50 mcg/act nasal spray 2 sprays into each nostril daily as needed    folic acid (FOLVITE) 1 mg tablet Take 1 tablet by mouth daily    gabapentin (Neurontin) 600 MG tablet Take 1 tablet (600 mg total) by mouth 3 (three) times a day    Homeopathic Products (LEG CRAMP RELIEF PO) Take by mouth    loratadine (CLARITIN) 10 mg tablet Take 10 mg by mouth daily as needed      metoprolol tartrate (LOPRESSOR) 25 mg tablet TAKE 1/2 OF A TABLET (12.5 MG TOTAL) BY MOUTH TWICE A DAY    multivitamin (THERAGRAN) TABS Take 1 tablet by mouth daily    naltrexone (REVIA) 50 mg tablet Take 50 mg by mouth daily    naproxen (NAPROSYN) 500 mg tablet Take 1 tablet (500 mg total) by mouth 2 (two) times a day as needed for mild pain or moderate pain    ondansetron (ZOFRAN) 4 mg tablet TAKE 1 TABLET BY MOUTH DAILY AS NEEDED FOR NAUSEA OR VOMITING    pantoprazole (PROTONIX) 20 mg tablet Take 20 mg by mouth daily    tamsulosin (FLOMAX) 0.4 mg Take 1 capsule by mouth every evening    pantoprazole (PROTONIX) 40 mg tablet Take 40 mg by mouth daily   (Patient not taking: Reported on 10/6/2023)    thiamine 100 MG tablet Take 1 tablet by mouth daily (Patient not taking: Reported on 9/8/2023)    traMADol (ULTRAM) 50 mg tablet Take 50 mg by mouth (Patient not taking: Reported on 9/8/2023)     No current facility-administered medications on file prior to visit. Allergies   Allergen Reactions    Other Other (See Comments)     Sore throat from too many raw oranges.        Physical Exam    /88 (BP Location: Left arm, Patient Position: Sitting, Cuff Size: Adult)   Pulse 83   Temp 97.9 °F (36.6 °C)   Ht 5' 5" (1.651 m)   Wt 59.4 kg (131 lb)   SpO2 99%   BMI 21.80 kg/m²     Constitutional: normal, well developed, well nourished, alert, in no distress and non-toxic and no overt pain behavior. Eyes: anicteric  HEENT: grossly intact  Neck: supple, symmetric, trachea midline and no masses   Pulmonary:even and unlabored  Cardiovascular:No edema or pitting edema present  Skin:Normal without rashes or lesions and well hydrated  Psychiatric:Mood and affect appropriate  Neurologic:Cranial Nerves II-XII grossly intact Sensation grossly intact; no clonus negative montgomery's. Reflexes 2+ and brisk. Spurling's maneuver positive left sided. Musculoskeletal:normal gait. 5/5 strength bilaterally with AROM in all extremities. Normal heel toe and tip toe walking. Significant pain with cervical and lumbar facet loading bilaterally and with lateral spine rotation. ttp over cervical and lumbar paraspinal muscles. Negative sebastián's test, negative gaenslen's negative SIJ loading bilaterally. Imaging    Multilevel disc degeneration and cervical spondylosis, greatest at C4-5,   C5-6, C6-7.   2. At C4-5, disc ridge complex with uncovertebral arthropathy. There is mild   impression of the ventral thecal sac with severe bilateral neural foraminal   stenosis. 3. At C5-6, disc ridge complex with uncovertebral arthropathy. There is mild   impression of the ventral thecal sac with severe bilateral neural foraminal   stenosis   4. At C6-7, central disc herniation. There is impression of the ventral thecal   sac without cord compression. There is mild bilateral neural foraminal   narrowing. 1. Multilevel disc degeneration and lumbar spondylosis   2. At L3-4, degenerative disc bulging with central disc herniation, ligamentum   flavum thickening facet arthropathy. Is moderate canal stenosis, bilateral   subarticular recess stenosis, moderate right, and severe left neural foraminal   narrowing.    3. At L4-5, disc bulge with central disc herniation, ligamentum flavum   thickening, facet arthropathy. There is mild canal stenosis with mild to   moderate right and mild to moderate left neural foraminal stenosis. 4. At L5-S1, disc bulge with right paracentral disc herniation. There is mild   impression of the ventral thecal sac, moderate right subarticular recess   stenosis, moderate bilateral neural foraminal stenosis.

## 2023-10-30 NOTE — ED PROVIDER NOTES
History  Chief Complaint   Patient presents with    Bleeding/Bruising     L lower leg bruising; was seen at vascular and referred to ED for DVT study     Patient complains of bilateral leg pain and swelling for the past few days. Noticed cramping in his calves when he moves. Sent here for further evaluation for possible DVT. No chest pain. No shortness of breath. No history of PE or DVT. No recent trauma. No recent surgeries. No recent travel. History provided by:  Patient   used: No    Leg Pain  Location:  Leg  Time since incident:  2 days  Injury: no    Leg location:  L leg and R leg  Pain details:     Quality:  Aching    Radiates to:  Does not radiate    Severity:  Mild    Onset quality:  Gradual    Duration:  2 days    Timing:  Constant    Progression:  Unchanged  Chronicity:  New  Prior injury to area:  No  Relieved by:  Nothing  Worsened by: Activity  Ineffective treatments:  None tried  Associated symptoms: swelling    Associated symptoms: no back pain, no decreased ROM, no fever and no neck pain        Prior to Admission Medications   Prescriptions Last Dose Informant Patient Reported? Taking?    Homeopathic Products (LEG CRAMP RELIEF PO)   Yes No   Sig: Take by mouth   aspirin (ECOTRIN LOW STRENGTH) 81 mg EC tablet   No No   Sig: Take 1 tablet (81 mg total) by mouth 2 (two) times a day   atorvastatin (LIPITOR) 40 mg tablet   Yes No   Sig: Take 40 mg by mouth daily   cyanocobalamin (VITAMIN B-12) 100 mcg tablet   Yes No   Sig: Take 100 mcg by mouth daily   fluticasone (FLONASE) 50 mcg/act nasal spray   Yes No   Si sprays into each nostril daily as needed   folic acid (FOLVITE) 1 mg tablet   Yes No   Sig: Take 1 tablet by mouth daily   gabapentin (Neurontin) 600 MG tablet   No No   Sig: Take 1 tablet (600 mg total) by mouth 3 (three) times a day   loratadine (CLARITIN) 10 mg tablet   Yes No   Sig: Take 10 mg by mouth daily as needed     metoprolol tartrate (LOPRESSOR) 25 mg tablet   Yes No   Sig: TAKE 1/2 OF A TABLET (12.5 MG TOTAL) BY MOUTH TWICE A DAY   multivitamin (THERAGRAN) TABS   Yes No   Sig: Take 1 tablet by mouth daily   naltrexone (REVIA) 50 mg tablet   Yes No   Sig: Take 50 mg by mouth daily   naproxen (NAPROSYN) 500 mg tablet   No No   Sig: Take 1 tablet (500 mg total) by mouth 2 (two) times a day as needed for mild pain or moderate pain   ondansetron (ZOFRAN) 4 mg tablet   Yes No   Sig: TAKE 1 TABLET BY MOUTH DAILY AS NEEDED FOR NAUSEA OR VOMITING   pantoprazole (PROTONIX) 20 mg tablet   Yes No   Sig: Take 20 mg by mouth daily   pantoprazole (PROTONIX) 40 mg tablet   Yes No   Sig: Take 40 mg by mouth daily     Patient not taking: Reported on 10/6/2023   tamsulosin (FLOMAX) 0.4 mg   Yes No   Sig: Take 1 capsule by mouth every evening   thiamine 100 MG tablet   Yes No   Sig: Take 1 tablet by mouth daily   Patient not taking: Reported on 9/8/2023   traMADol (ULTRAM) 50 mg tablet   Yes No   Sig: Take 50 mg by mouth   Patient not taking: Reported on 9/8/2023      Facility-Administered Medications: None       Past Medical History:   Diagnosis Date    Fatty liver     Food allergy     strawberries- tickling in throat    GERD (gastroesophageal reflux disease)     Leg cramps     OA (osteoarthritis)     left knee    Seasonal allergies     Wears glasses        Past Surgical History:   Procedure Laterality Date    CATARACT EXTRACTION Bilateral     CHOLECYSTECTOMY      EPIDURAL BLOCK INJECTION N/A 09/14/2023    Procedure: BLOCK / INJECTION EPIDURAL STEROID CERVICAL C7-T1;  Surgeon: Elmer Krishnamurthy MD;  Location: [facility];  Service: Pain Management     EPIDURAL BLOCK INJECTION N/A 10/12/2023    Procedure: BLOCK / INJECTION EPIDURAL STEROID LUMBAR L4-L5;  Surgeon: Elmer Krishnamurthy MD;  Location: [facility];  Service: Pain Management     ESOPHAGOGASTRODUODENOSCOPY      HAND SURGERY Right     KNEE ARTHROSCOPY Left     LIVER BIOPSY      CO ARTHRP KNE CONDYLE&PLATU MEDIAL&LAT COMPARTMENTS Left 2018    Procedure: ARTHROPLASTY KNEE TOTAL;  Surgeon: Steva Gosselin, MD;  Location: [facility];  Service: Orthopedics    US GUIDED LIVER BIOPSY  2022       History reviewed. No pertinent family history. I have reviewed and agree with the history as documented. E-Cigarette/Vaping    E-Cigarette Use Never User      E-Cigarette/Vaping Substances     Social History     Tobacco Use    Smoking status: Former     Types: Cigarettes     Quit date: 7/10/1976     Years since quittin.3    Smokeless tobacco: Never   Vaping Use    Vaping Use: Never used   Substance Use Topics    Alcohol use: Yes     Alcohol/week: 3.0 standard drinks of alcohol     Types: 3 Cans of beer per week     Comment: 2-3 drinks day beer    Drug use: No       Review of Systems   Constitutional:  Negative for chills and fever. HENT:  Negative for ear pain, hearing loss, sore throat, trouble swallowing and voice change. Eyes:  Negative for pain and discharge. Respiratory:  Negative for cough, shortness of breath and wheezing. Cardiovascular:  Negative for chest pain and palpitations. Gastrointestinal:  Negative for abdominal pain, blood in stool, constipation, diarrhea, nausea and vomiting. Genitourinary:  Negative for dysuria, flank pain, frequency and hematuria. Musculoskeletal:  Positive for myalgias. Negative for back pain, joint swelling, neck pain and neck stiffness. Skin:  Negative for rash and wound. Neurological:  Negative for dizziness, seizures, syncope, facial asymmetry and headaches. Psychiatric/Behavioral:  Negative for hallucinations, self-injury and suicidal ideas. All other systems reviewed and are negative. Physical Exam  Physical Exam  Vitals and nursing note reviewed. Constitutional:       General: He is not in acute distress. Appearance: He is well-developed. HENT:      Head: Normocephalic and atraumatic.       Right Ear: External ear normal.      Left Ear: External ear normal. Eyes:      General: No scleral icterus. Right eye: No discharge. Left eye: No discharge. Extraocular Movements: Extraocular movements intact. Conjunctiva/sclera: Conjunctivae normal.   Cardiovascular:      Rate and Rhythm: Normal rate and regular rhythm. Heart sounds: Normal heart sounds. No murmur heard. Pulmonary:      Effort: Pulmonary effort is normal.      Breath sounds: Normal breath sounds. No wheezing or rales. Abdominal:      General: Bowel sounds are normal. There is no distension. Palpations: Abdomen is soft. Tenderness: There is no abdominal tenderness. There is no guarding or rebound. Musculoskeletal:         General: No deformity. Normal range of motion. Cervical back: Normal range of motion and neck supple. Comments: Dorsalis pedis pulse 2+ bilaterally. Ecchymoses noted to both legs. No palpable cord. No erythema or warmth. No induration. No fluctuance or discharge. Skin:     General: Skin is warm and dry. Findings: No rash. Neurological:      General: No focal deficit present. Mental Status: He is alert and oriented to person, place, and time. Cranial Nerves: No cranial nerve deficit. Psychiatric:         Mood and Affect: Mood normal.         Behavior: Behavior normal.         Thought Content:  Thought content normal.         Judgment: Judgment normal.         Vital Signs  ED Triage Vitals [10/30/23 1455]   Temperature Pulse Respirations Blood Pressure SpO2   98.1 °F (36.7 °C) 82 18 133/70 99 %      Temp Source Heart Rate Source Patient Position - Orthostatic VS BP Location FiO2 (%)   Tympanic Monitor -- Right arm --      Pain Score       No Pain           Vitals:    10/30/23 1455 10/30/23 1500 10/30/23 1515 10/30/23 1530   BP: 133/70 133/70 135/77 123/74   Pulse: 82 72 73 67         Visual Acuity      ED Medications  Medications   magnesium Oxide (MAG-OX) tablet 400 mg (has no administration in time range) Diagnostic Studies  Results Reviewed       Procedure Component Value Units Date/Time    Comprehensive metabolic panel [[id number]]  (Abnormal) Collected: 10/30/23 1512    Lab Status: Final result Specimen: Blood from Arm, Left Updated: 10/30/23 1538     Sodium 138 mmol/L      Potassium 3.5 mmol/L      Chloride 104 mmol/L      CO2 23 mmol/L      ANION GAP 11 mmol/L      BUN 8 mg/dL      Creatinine 0.72 mg/dL      Glucose 97 mg/dL      Calcium 8.9 mg/dL      AST 28 U/L      ALT 18 U/L      Alkaline Phosphatase 85 U/L      Total Protein 6.7 g/dL      Albumin 3.9 g/dL      Total Bilirubin 1.02 mg/dL      eGFR 94 ml/min/1.73sq m     Narrative:      [place] guidelines for Chronic Kidney Disease (CKD):     Stage 1 with normal or high GFR (GFR > 90 mL/min/1.73 square meters)    Stage 2 Mild CKD (GFR = 60-89 mL/min/1.73 square meters)    Stage 3A Moderate CKD (GFR = 45-59 mL/min/1.73 square meters)    Stage 3B Moderate CKD (GFR = 30-44 mL/min/1.73 square meters)    Stage 4 Severe CKD (GFR = 15-29 mL/min/1.73 square meters)    Stage 5 End Stage CKD (GFR <15 mL/min/1.73 square meters)  Note: GFR calculation is accurate only with a steady state creatinine    CK [[id number]]  (Normal) Collected: 10/30/23 1512    Lab Status: Final result Specimen: Blood from Arm, Left Updated: 10/30/23 1538     Total CK 72 U/L     Magnesium [[id number]]  (Abnormal) Collected: 10/30/23 1512    Lab Status: Final result Specimen: Blood from Arm, Left Updated: 10/30/23 1538     Magnesium 1.7 mg/dL     Protime-INR [[id number]]  (Normal) Collected: 10/30/23 1512    Lab Status: Final result Specimen: Blood from Arm, Left Updated: 10/30/23 1536     Protime 13.4 seconds      INR 0.99    APTT [[id number]]  (Normal) Collected: 10/30/23 1512    Lab Status: Final result Specimen: Blood from Arm, Left Updated: 10/30/23 1536     PTT 25 seconds     CBC and differential [[id number]]  (Abnormal) Collected: 10/30/23 7787    Lab Status: Final result Specimen: Blood from Arm, Left Updated: 10/30/23 1531     WBC 6.52 Thousand/uL      RBC 3.81 Million/uL      Hemoglobin 12.4 g/dL      Hematocrit 37.5 %      MCV 98 fL      MCH 32.5 pg      MCHC 33.1 g/dL      RDW 15.7 %      MPV 9.4 fL      Platelets 189 Thousands/uL      nRBC 0 /100 WBCs      Neutrophils Relative 71 %      Immat GRANS % 1 %      Lymphocytes Relative 15 %      Monocytes Relative 11 %      Eosinophils Relative 1 %      Basophils Relative 1 %      Neutrophils Absolute 4.66 Thousands/µL      Immature Grans Absolute 0.05 Thousand/uL      Lymphocytes Absolute 0.99 Thousands/µL      Monocytes Absolute 0.69 Thousand/µL      Eosinophils Absolute 0.09 Thousand/µL      Basophils Absolute 0.04 Thousands/µL                    VAS lower limb venous duplex study, complete bilateral    (Results Pending)              Procedures  Procedures         ED Course                               SBIRT 22yo+      Flowsheet Row Most Recent Value   Initial Alcohol Screen: US AUDIT-C     1. How often do you have a drink containing alcohol? 0 Filed at: 10/30/2023 1454   2. How many drinks containing alcohol do you have on a typical day you are drinking? 0 Filed at: 10/30/2023 1454   3a. Male UNDER 65: How often do you have five or more drinks on one occasion? 0 Filed at: 10/30/2023 1454   3b. FEMALE Any Age, or MALE 65+: How often do you have 4 or more drinks on one occassion? 0 Filed at: 10/30/2023 1454   Audit-C Score 0 Filed at: 10/30/2023 1454   AMADOU: How many times in the past year have you. .. Used an illegal drug or used a prescription medication for non-medical reasons? Never Filed at: 10/30/2023 1454                      Medical Decision Making  Amount and/or Complexity of Data Reviewed  Labs: ordered. Decision-making details documented in ED Course. Radiology: ordered. Decision-making details documented in ED Course.   Discussion of management or test interpretation with external provider(s): Differential diagnosis includes but not limited to contusion versus varicose veins, versus thrombophlebitis versus DVT. Risk  OTC drugs. Disposition  Final diagnoses:   Hypomagnesemia   Leg cramps     Time reflects when diagnosis was documented in both MDM as applicable and the Disposition within this note       Time User Action Codes Description Comment    10/30/2023  3:42 PM Gautam Santoyo Add [E83.42] Hypomagnesemia     10/30/2023  3:55 PM Alber Bermeo Add [R25.2] Leg cramps           ED Disposition       ED Disposition   Discharge    Condition   Stable    Date/Time   Mon Oct 30, 2023 [address] discharge to home/self care. Follow-up Information       Follow up With Specialties Details Why Contact Info    Lesvia Ni MD Family Medicine Call in 1 day  [address] [facility]  [phone]              Patient's Medications   Discharge Prescriptions    MAGNESIUM OXIDE 400 MG CAPS    Take 1 tablet (400 mg total) by mouth in the morning for 10 days       Start Date: 10/30/2023End Date: 11/9/2023       Order Dose: 400 mg       Quantity: 10 tablet    Refills: 0       No discharge procedures on file.     PDMP Review       None            ED Provider  Electronically Signed by             Gautam Santoyo MD  10/30/23 [id number]

## 2023-10-31 PROCEDURE — 93970 EXTREMITY STUDY: CPT | Performed by: SURGERY

## (undated) DEVICE — STERILE LATEX POWDER-FREE SURGICAL GLOVESWITH NITRILE COATING: Brand: PROTEXIS

## (undated) DEVICE — SILVER-COATED ANTIMICROBIAL BARRIER DRESSING: Brand: ACTICOAT FLEX7 4" X 5"

## (undated) DEVICE — 3M(TM) TRANSPORE SURGICAL TAPE 1527-1: Brand: 3M™ TRANSPORE™

## (undated) DEVICE — GLOVE SRG BIOGEL 8.5

## (undated) DEVICE — GAUZE SPONGES,16 PLY: Brand: CURITY

## (undated) DEVICE — DRAPE TOWEL: Brand: CONVERTORS

## (undated) DEVICE — GLOVE INDICATOR PI UNDERGLOVE SZ 7.5 BLUE

## (undated) DEVICE — ACE WRAP 6 IN UNSTERILE

## (undated) DEVICE — IV EXTENSION TUBING SMALL BORE

## (undated) DEVICE — SURGICAL GOWN, XL SMARTSLEEVE: Brand: CONVERTORS

## (undated) DEVICE — SYRINGE 10ML LL

## (undated) DEVICE — GLOVE SRG BIOGEL ORTHOPEDIC 7.5

## (undated) DEVICE — SUT VICRYL 2-0 CT-1 36 IN J945H

## (undated) DEVICE — TRAY EPID CONT PERIFIX 18G X 3.5IN 10ML CLSD TIP

## (undated) DEVICE — GLOVE INDICATOR PI UNDERGLOVE SZ 8.5 BLUE

## (undated) DEVICE — GAUZE SPONGES,USP TYPE VII GAUZE, 12 PLY: Brand: CURITY

## (undated) DEVICE — SCD SEQUENTIAL COMPRESSION COMFORT SLEEVE MEDIUM KNEE LENGTH: Brand: KENDALL SCD

## (undated) DEVICE — VEST SURGEON DISPOSABLE

## (undated) DEVICE — COBAN 6 IN STERILE

## (undated) DEVICE — 3000CC GUARDIAN II: Brand: GUARDIAN

## (undated) DEVICE — Device

## (undated) DEVICE — INTENDED FOR TISSUE SEPARATION, AND OTHER PROCEDURES THAT REQUIRE A SHARP SURGICAL BLADE TO PUNCTURE OR CUT.: Brand: BARD-PARKER SAFETY BLADES SIZE 10, STERILE

## (undated) DEVICE — PENCIL ELECTROSURG E-Z CLEAN -0035H

## (undated) DEVICE — CHLORAPREP HI-LITE 10.5ML ORANGE

## (undated) DEVICE — 3M™ DURAPORE™ SURGICAL TAPE 1538-3, 3 INCH X 10 YARD (7,5CM X 9,1M), 4 ROLLS/BOX: Brand: 3M™ DURAPORE™

## (undated) DEVICE — DRAPE SHEET THREE QUARTER

## (undated) DEVICE — TRAY FOLEY 16FR URIMETER SURESTEP

## (undated) DEVICE — SYRINGE 30ML LL

## (undated) DEVICE — 3M™ TEGADERM™ TRANSPARENT FILM DRESSING FRAME STYLE, 1627, 4 IN X 10 IN (10 CM X 25 CM), 20/CT 4CT/CASE: Brand: 3M™ TEGADERM™

## (undated) DEVICE — 3M™ STERI-DRAPE™ U-DRAPE 1015: Brand: STERI-DRAPE™

## (undated) DEVICE — GLOVE SRG BIOGEL 8

## (undated) DEVICE — GAMMEX® NON-LATEX SENSITIVE SIZE 7.5, STERILE NEOPRENE POWDER-FREE SURGICAL GLOVE: Brand: GAMMEX

## (undated) DEVICE — GLOVE SRG BIOGEL 7.5

## (undated) DEVICE — 10FR FRAZIER SUCTION HANDLE: Brand: CARDINAL HEALTH

## (undated) DEVICE — PLASTIC ADHESIVE BANDAGE: Brand: CURITY

## (undated) DEVICE — NEEDLE HYPO 22G X 1-1/2 IN

## (undated) DEVICE — CEMENT MIXING CARTRIDGE PRISM II

## (undated) DEVICE — PROXIMATE SKIN STAPLERS (35 WIDE) CONTAINS 35 STAINLESS STEEL STAPLES (FIXED HEAD): Brand: PROXIMATE

## (undated) DEVICE — HOOD: Brand: FLYTE

## (undated) DEVICE — CHLORAPREP HI-LITE 26ML ORANGE

## (undated) DEVICE — THE SIMPULSE SOLO SYSTEM WITH ULTREX RETRACTABLE SPLASH SHIELD TIP: Brand: SIMPULSE SOLO

## (undated) DEVICE — WEBRIL 6 IN UNSTERILE

## (undated) DEVICE — WET SKIN PREP TRAY: Brand: MEDLINE INDUSTRIES, INC.

## (undated) DEVICE — SYRINGE 5ML LL

## (undated) DEVICE — BIPOLAR SEALER 23-113-1 AQM 2.3: Brand: AQUAMANTYS™

## (undated) DEVICE — PREP SURGICAL PURPREP 26ML

## (undated) DEVICE — SAW BLADE RECIPROCATING 179

## (undated) DEVICE — UTILITY MARKER,BLACK WITH LABELS: Brand: DEVON

## (undated) DEVICE — SYRINGE LOR 8ML PLASTIC LL

## (undated) DEVICE — CUFF TOURNIQUET 30 X 4 IN QUICK CONNECT DISP 1BLA

## (undated) DEVICE — BETHLEHEM UNIV TOTAL KNEE, KIT: Brand: CARDINAL HEALTH

## (undated) DEVICE — SAW BLADE OSCILLATING BRAZOL 167

## (undated) DEVICE — SKIN MARKER DUAL TIP WITH RULER CAP, FLEXIBLE RULER AND LABELS: Brand: DEVON

## (undated) DEVICE — SUT FIBERWIRE #2 1/2 CIRCLE T-5 38IN AR-7200

## (undated) DEVICE — NEEDLE BLUNT 18 G X 1 1/2IN

## (undated) DEVICE — COOL TEMP PAD

## (undated) DEVICE — SPONGE LAP 18 X 18 IN

## (undated) DEVICE — 3M™ IOBAN™ 2 ANTIMICROBIAL INCISE DRAPE 6648EZ: Brand: IOBAN™ 2

## (undated) DEVICE — VIOLET BRAIDED (POLYGLACTIN 910), SYNTHETIC ABSORBABLE SUTURE: Brand: COATED VICRYL

## (undated) DEVICE — PADDING CAST 6IN COTTON STRL

## (undated) DEVICE — GLOVE INDICATOR PI UNDERGLOVE SZ 8 BLUE

## (undated) DEVICE — IMPERVIOUS STOCKINETTE: Brand: DEROYAL